# Patient Record
Sex: FEMALE | Race: WHITE | NOT HISPANIC OR LATINO | URBAN - METROPOLITAN AREA
[De-identification: names, ages, dates, MRNs, and addresses within clinical notes are randomized per-mention and may not be internally consistent; named-entity substitution may affect disease eponyms.]

---

## 2018-05-13 ENCOUNTER — EMERGENCY (EMERGENCY)
Facility: HOSPITAL | Age: 63
LOS: 1 days | Discharge: HOME | End: 2018-05-13
Attending: EMERGENCY MEDICINE | Admitting: EMERGENCY MEDICINE
Payer: COMMERCIAL

## 2018-05-13 VITALS
SYSTOLIC BLOOD PRESSURE: 136 MMHG | OXYGEN SATURATION: 99 % | DIASTOLIC BLOOD PRESSURE: 67 MMHG | HEART RATE: 71 BPM | RESPIRATION RATE: 16 BRPM | TEMPERATURE: 98 F

## 2018-05-13 VITALS
HEART RATE: 60 BPM | SYSTOLIC BLOOD PRESSURE: 129 MMHG | OXYGEN SATURATION: 100 % | RESPIRATION RATE: 18 BRPM | DIASTOLIC BLOOD PRESSURE: 58 MMHG

## 2018-05-13 LAB
ALBUMIN SERPL ELPH-MCNC: 4.4 G/DL — SIGNIFICANT CHANGE UP (ref 3.3–5)
ALP SERPL-CCNC: 83 U/L — SIGNIFICANT CHANGE UP (ref 40–120)
ALT FLD-CCNC: 9 U/L — SIGNIFICANT CHANGE UP (ref 4–33)
APPEARANCE UR: SIGNIFICANT CHANGE UP
APTT BLD: 31.1 SEC — SIGNIFICANT CHANGE UP (ref 27.5–37.4)
AST SERPL-CCNC: 13 U/L — SIGNIFICANT CHANGE UP (ref 4–32)
BACTERIA # UR AUTO: HIGH
BASE EXCESS BLDV CALC-SCNC: -0.6 MMOL/L — SIGNIFICANT CHANGE UP
BASOPHILS # BLD AUTO: 0.04 K/UL — SIGNIFICANT CHANGE UP (ref 0–0.2)
BASOPHILS NFR BLD AUTO: 0.5 % — SIGNIFICANT CHANGE UP (ref 0–2)
BILIRUB SERPL-MCNC: 0.8 MG/DL — SIGNIFICANT CHANGE UP (ref 0.2–1.2)
BILIRUB UR-MCNC: NEGATIVE — SIGNIFICANT CHANGE UP
BLOOD GAS VENOUS - CREATININE: 1.01 MG/DL — SIGNIFICANT CHANGE UP (ref 0.5–1.3)
BLOOD UR QL VISUAL: NEGATIVE — SIGNIFICANT CHANGE UP
BUN SERPL-MCNC: 12 MG/DL — SIGNIFICANT CHANGE UP (ref 7–23)
CALCIUM SERPL-MCNC: 9.1 MG/DL — SIGNIFICANT CHANGE UP (ref 8.4–10.5)
CHLORIDE BLDV-SCNC: 111 MMOL/L — HIGH (ref 96–108)
CHLORIDE SERPL-SCNC: 103 MMOL/L — SIGNIFICANT CHANGE UP (ref 98–107)
CO2 SERPL-SCNC: 23 MMOL/L — SIGNIFICANT CHANGE UP (ref 22–31)
COLOR SPEC: SIGNIFICANT CHANGE UP
CREAT SERPL-MCNC: 1.13 MG/DL — SIGNIFICANT CHANGE UP (ref 0.5–1.3)
EOSINOPHIL # BLD AUTO: 0.17 K/UL — SIGNIFICANT CHANGE UP (ref 0–0.5)
EOSINOPHIL NFR BLD AUTO: 2.3 % — SIGNIFICANT CHANGE UP (ref 0–6)
GAS PNL BLDV: 138 MMOL/L — SIGNIFICANT CHANGE UP (ref 136–146)
GLUCOSE BLDV-MCNC: 93 — SIGNIFICANT CHANGE UP (ref 70–99)
GLUCOSE SERPL-MCNC: 94 MG/DL — SIGNIFICANT CHANGE UP (ref 70–99)
GLUCOSE UR-MCNC: NEGATIVE — SIGNIFICANT CHANGE UP
HCO3 BLDV-SCNC: 23 MMOL/L — SIGNIFICANT CHANGE UP (ref 20–27)
HCT VFR BLD CALC: 38.3 % — SIGNIFICANT CHANGE UP (ref 34.5–45)
HCT VFR BLDV CALC: 43.2 % — SIGNIFICANT CHANGE UP (ref 34.5–45)
HGB BLD-MCNC: 13.4 G/DL — SIGNIFICANT CHANGE UP (ref 11.5–15.5)
HGB BLDV-MCNC: 14.1 G/DL — SIGNIFICANT CHANGE UP (ref 11.5–15.5)
IMM GRANULOCYTES # BLD AUTO: 0.01 # — SIGNIFICANT CHANGE UP
IMM GRANULOCYTES NFR BLD AUTO: 0.1 % — SIGNIFICANT CHANGE UP (ref 0–1.5)
INR BLD: 0.99 — SIGNIFICANT CHANGE UP (ref 0.88–1.17)
KETONES UR-MCNC: NEGATIVE — SIGNIFICANT CHANGE UP
LACTATE BLDV-MCNC: 0.9 MMOL/L — SIGNIFICANT CHANGE UP (ref 0.5–2)
LEUKOCYTE ESTERASE UR-ACNC: HIGH
LIDOCAIN IGE QN: 35.4 U/L — SIGNIFICANT CHANGE UP (ref 7–60)
LYMPHOCYTES # BLD AUTO: 2.66 K/UL — SIGNIFICANT CHANGE UP (ref 1–3.3)
LYMPHOCYTES # BLD AUTO: 35.7 % — SIGNIFICANT CHANGE UP (ref 13–44)
MCHC RBC-ENTMCNC: 32 PG — SIGNIFICANT CHANGE UP (ref 27–34)
MCHC RBC-ENTMCNC: 35 % — SIGNIFICANT CHANGE UP (ref 32–36)
MCV RBC AUTO: 91.4 FL — SIGNIFICANT CHANGE UP (ref 80–100)
MONOCYTES # BLD AUTO: 0.51 K/UL — SIGNIFICANT CHANGE UP (ref 0–0.9)
MONOCYTES NFR BLD AUTO: 6.8 % — SIGNIFICANT CHANGE UP (ref 2–14)
MUCOUS THREADS # UR AUTO: SIGNIFICANT CHANGE UP
NEUTROPHILS # BLD AUTO: 4.06 K/UL — SIGNIFICANT CHANGE UP (ref 1.8–7.4)
NEUTROPHILS NFR BLD AUTO: 54.6 % — SIGNIFICANT CHANGE UP (ref 43–77)
NITRITE UR-MCNC: NEGATIVE — SIGNIFICANT CHANGE UP
NON-SQ EPI CELLS # UR AUTO: <1 — SIGNIFICANT CHANGE UP
NRBC # FLD: 0 — SIGNIFICANT CHANGE UP
PCO2 BLDV: 42 MMHG — SIGNIFICANT CHANGE UP (ref 41–51)
PH BLDV: 7.37 PH — SIGNIFICANT CHANGE UP (ref 7.32–7.43)
PH UR: 6 — SIGNIFICANT CHANGE UP (ref 4.6–8)
PLATELET # BLD AUTO: 295 K/UL — SIGNIFICANT CHANGE UP (ref 150–400)
PMV BLD: 9.7 FL — SIGNIFICANT CHANGE UP (ref 7–13)
PO2 BLDV: 42 MMHG — HIGH (ref 35–40)
POTASSIUM BLDV-SCNC: 3.4 MMOL/L — SIGNIFICANT CHANGE UP (ref 3.4–4.5)
POTASSIUM SERPL-MCNC: 3.5 MMOL/L — SIGNIFICANT CHANGE UP (ref 3.5–5.3)
POTASSIUM SERPL-SCNC: 3.5 MMOL/L — SIGNIFICANT CHANGE UP (ref 3.5–5.3)
PROT SERPL-MCNC: 7.5 G/DL — SIGNIFICANT CHANGE UP (ref 6–8.3)
PROT UR-MCNC: NEGATIVE MG/DL — SIGNIFICANT CHANGE UP
PROTHROM AB SERPL-ACNC: 11 SEC — SIGNIFICANT CHANGE UP (ref 9.8–13.1)
RBC # BLD: 4.19 M/UL — SIGNIFICANT CHANGE UP (ref 3.8–5.2)
RBC # FLD: 13.7 % — SIGNIFICANT CHANGE UP (ref 10.3–14.5)
RBC CASTS # UR COMP ASSIST: SIGNIFICANT CHANGE UP (ref 0–?)
SAO2 % BLDV: 76.5 % — SIGNIFICANT CHANGE UP (ref 60–85)
SODIUM SERPL-SCNC: 139 MMOL/L — SIGNIFICANT CHANGE UP (ref 135–145)
SP GR SPEC: 1.01 — SIGNIFICANT CHANGE UP (ref 1–1.04)
SQUAMOUS # UR AUTO: SIGNIFICANT CHANGE UP
UROBILINOGEN FLD QL: NORMAL MG/DL — SIGNIFICANT CHANGE UP
WBC # BLD: 7.45 K/UL — SIGNIFICANT CHANGE UP (ref 3.8–10.5)
WBC # FLD AUTO: 7.45 K/UL — SIGNIFICANT CHANGE UP (ref 3.8–10.5)
WBC UR QL: SIGNIFICANT CHANGE UP (ref 0–?)

## 2018-05-13 PROCEDURE — 76830 TRANSVAGINAL US NON-OB: CPT | Mod: 26

## 2018-05-13 PROCEDURE — 99285 EMERGENCY DEPT VISIT HI MDM: CPT

## 2018-05-13 PROCEDURE — 74177 CT ABD & PELVIS W/CONTRAST: CPT | Mod: 26

## 2018-05-13 RX ORDER — MORPHINE SULFATE 50 MG/1
2 CAPSULE, EXTENDED RELEASE ORAL ONCE
Qty: 0 | Refills: 0 | Status: DISCONTINUED | OUTPATIENT
Start: 2018-05-13 | End: 2018-05-13

## 2018-05-13 RX ORDER — SODIUM CHLORIDE 9 MG/ML
1000 INJECTION INTRAMUSCULAR; INTRAVENOUS; SUBCUTANEOUS ONCE
Qty: 0 | Refills: 0 | Status: COMPLETED | OUTPATIENT
Start: 2018-05-13 | End: 2018-05-13

## 2018-05-13 RX ADMIN — MORPHINE SULFATE 2 MILLIGRAM(S): 50 CAPSULE, EXTENDED RELEASE ORAL at 21:06

## 2018-05-13 RX ADMIN — MORPHINE SULFATE 2 MILLIGRAM(S): 50 CAPSULE, EXTENDED RELEASE ORAL at 22:00

## 2018-05-13 RX ADMIN — SODIUM CHLORIDE 500 MILLILITER(S): 9 INJECTION INTRAMUSCULAR; INTRAVENOUS; SUBCUTANEOUS at 20:27

## 2018-05-13 NOTE — ED PROVIDER NOTE - PHYSICAL EXAMINATION
*GEN:   in no acute distress, AOx3    ///    *EYES:   pupils equally round and reactive to light, extra-occular movements intact    ///    *HEENT:   airway patent, moist mucosal membranes    ///    *CV:   regular rate and rhythm    ///    *RESP:   clear to auscultation bilaterally, non-labored    ///    *ABD:   soft, mild tenderness to palpation RLQ w/out rebound    ///    *:   no cva/flank tenderness    ///    *MSK:   no MSK tenderness or limited ROM    ///    *SKIN:   dry, intact    ///    *NEURO:   AOx3, no focal weakness or loss of sensation, gait normal

## 2018-05-13 NOTE — ED PROVIDER NOTE - MEDICAL DECISION MAKING DETAILS
63 yo F w/ RLQ pain x 10 days, h/o cholecystectomy and partial oophrectomy, r/o appendicitis vs ovarian pathology, other intra-abd pathology; CT ab/p, labs, pain relief, reassess

## 2018-05-13 NOTE — ED ADULT NURSE NOTE - CHIEF COMPLAINT QUOTE
Pt. c/o intermittent RLQ pain x 2 weeks. Now more constant, radiating to right flank and having diarrhea right after eating a meal. Denies n/v or fevers. Pmhx glaucoma, gastritis, Mitral valve prolapse

## 2018-05-13 NOTE — ED PROVIDER NOTE - ATTENDING CONTRIBUTION TO CARE
kellie: approx 2-3 week hx of intermittent pain rt low low abdomen. increased past 2-3 days, radiating to the low back. No other new sx.   exam: mild tenderness rlq. no guarding. no inguinal tenderness. no CVAT. exam otherwise unremarkable.  recc: ultrasound pelvic to evaluate ovary. CT to evaluate for appendicitis/other abd pathology

## 2018-05-13 NOTE — ED ADULT TRIAGE NOTE - CHIEF COMPLAINT QUOTE
Pt. c/o intermittent RLQ pain x 2 weeks. Now more constant, radiating to right flank and having diarrhea right after eating a meal. Denies n/v or fevers. Pmhx glaucoma, gastritis, Mitral valve prolapse RHINORRHEA

## 2018-05-13 NOTE — ED ADULT NURSE REASSESSMENT NOTE - NS ED NURSE REASSESS COMMENT FT1
pt aox4; oral contrast completed at this time; pt reports pain in abdomen radiating to right side.  Pending ct of ct of abdomen. Will continue to monitor/assess

## 2018-05-13 NOTE — ED ADULT NURSE NOTE - OBJECTIVE STATEMENT
c/o RLQ pain for 2 weeks. radiating to groin/hip and R lower back. was intermittent, now constant. also having diarrhea. no nausea, vomiting, urinary symptoms. labs sent. 20 g iv placed in left ac. awaits md ratliff in no acute distress. rpt given to primary RN in area. c/o RLQ pain for 2 weeks. radiating to groin/hip and R lower back. was intermittent, now constant. also having diarrhea, non bloody. no nausea, vomiting, urinary symptoms. labs sent. 20 g iv placed in right ac. awaits md ratliff in no acute distress. rpt given to primary RN in area.

## 2018-05-13 NOTE — ED PROVIDER NOTE - PMH
Gastritis    Heart Murmur  Denies syncope, denies dizziness, denies palpitations.  last echo done n2008  Migraine Headache

## 2018-05-14 RX ORDER — DIPHENHYDRAMINE HCL 50 MG
50 CAPSULE ORAL ONCE
Qty: 0 | Refills: 0 | Status: COMPLETED | OUTPATIENT
Start: 2018-05-14 | End: 2018-05-14

## 2018-05-14 RX ORDER — OXYCODONE AND ACETAMINOPHEN 5; 325 MG/1; MG/1
1 TABLET ORAL ONCE
Qty: 0 | Refills: 0 | Status: DISCONTINUED | OUTPATIENT
Start: 2018-05-14 | End: 2018-05-14

## 2018-05-14 RX ORDER — CEPHALEXIN 500 MG
1 CAPSULE ORAL
Qty: 10 | Refills: 0 | OUTPATIENT
Start: 2018-05-14 | End: 2018-05-18

## 2018-05-14 RX ADMIN — OXYCODONE AND ACETAMINOPHEN 1 TABLET(S): 5; 325 TABLET ORAL at 02:05

## 2018-05-14 NOTE — ED ADULT NURSE REASSESSMENT NOTE - NS ED NURSE REASSESS COMMENT FT1
pt aox4; given pain medication for pain 5/10 prior to d/c as per MD Narayanan.  JAVIER instructions given IV access removed. Pt verbalized understanding

## 2018-06-04 ENCOUNTER — APPOINTMENT (OUTPATIENT)
Dept: PAIN MANAGEMENT | Facility: CLINIC | Age: 63
End: 2018-06-04
Payer: COMMERCIAL

## 2018-06-04 VITALS
WEIGHT: 159 LBS | HEIGHT: 69 IN | HEART RATE: 64 BPM | SYSTOLIC BLOOD PRESSURE: 133 MMHG | BODY MASS INDEX: 23.55 KG/M2 | DIASTOLIC BLOOD PRESSURE: 79 MMHG

## 2018-06-04 PROCEDURE — 99214 OFFICE O/P EST MOD 30 MIN: CPT

## 2018-06-04 RX ORDER — AMOXICILLIN 500 MG/1
500 CAPSULE ORAL
Qty: 24 | Refills: 0 | Status: ACTIVE | COMMUNITY
Start: 2017-12-15

## 2018-06-11 ENCOUNTER — MEDICATION RENEWAL (OUTPATIENT)
Age: 63
End: 2018-06-11

## 2018-10-25 ENCOUNTER — INPATIENT (INPATIENT)
Facility: HOSPITAL | Age: 63
LOS: 0 days | Discharge: HOME | DRG: 310 | End: 2018-10-26
Attending: HOSPITALIST | Admitting: HOSPITALIST
Payer: COMMERCIAL

## 2018-10-25 VITALS
SYSTOLIC BLOOD PRESSURE: 121 MMHG | WEIGHT: 156.09 LBS | RESPIRATION RATE: 18 BRPM | TEMPERATURE: 98 F | DIASTOLIC BLOOD PRESSURE: 56 MMHG | HEIGHT: 69 IN | HEART RATE: 90 BPM | OXYGEN SATURATION: 97 %

## 2018-10-25 DIAGNOSIS — F32.9 MAJOR DEPRESSIVE DISORDER, SINGLE EPISODE, UNSPECIFIED: ICD-10-CM

## 2018-10-25 DIAGNOSIS — H40.9 UNSPECIFIED GLAUCOMA: ICD-10-CM

## 2018-10-25 DIAGNOSIS — K29.70 GASTRITIS, UNSPECIFIED, WITHOUT BLEEDING: ICD-10-CM

## 2018-10-25 DIAGNOSIS — R01.1 CARDIAC MURMUR, UNSPECIFIED: ICD-10-CM

## 2018-10-25 DIAGNOSIS — Z29.9 ENCOUNTER FOR PROPHYLACTIC MEASURES, UNSPECIFIED: ICD-10-CM

## 2018-10-25 DIAGNOSIS — Z00.00 ENCOUNTER FOR GENERAL ADULT MEDICAL EXAMINATION WITHOUT ABNORMAL FINDINGS: ICD-10-CM

## 2018-10-25 DIAGNOSIS — I48.91 UNSPECIFIED ATRIAL FIBRILLATION: ICD-10-CM

## 2018-10-25 LAB
ALBUMIN SERPL ELPH-MCNC: 4.3 G/DL — SIGNIFICANT CHANGE UP (ref 3.3–5)
ALP SERPL-CCNC: 84 U/L — SIGNIFICANT CHANGE UP (ref 40–120)
ALT FLD-CCNC: 7 U/L — LOW (ref 10–45)
ANION GAP SERPL CALC-SCNC: 13 MMOL/L — SIGNIFICANT CHANGE UP (ref 5–17)
AST SERPL-CCNC: 12 U/L — SIGNIFICANT CHANGE UP (ref 10–40)
BASOPHILS # BLD AUTO: 0 K/UL — SIGNIFICANT CHANGE UP (ref 0–0.2)
BASOPHILS NFR BLD AUTO: 0.5 % — SIGNIFICANT CHANGE UP (ref 0–2)
BILIRUB SERPL-MCNC: 0.9 MG/DL — SIGNIFICANT CHANGE UP (ref 0.2–1.2)
BUN SERPL-MCNC: 11 MG/DL — SIGNIFICANT CHANGE UP (ref 7–23)
CALCIUM SERPL-MCNC: 10.1 MG/DL — SIGNIFICANT CHANGE UP (ref 8.4–10.5)
CHLORIDE SERPL-SCNC: 106 MMOL/L — SIGNIFICANT CHANGE UP (ref 96–108)
CO2 SERPL-SCNC: 22 MMOL/L — SIGNIFICANT CHANGE UP (ref 22–31)
CREAT SERPL-MCNC: 0.86 MG/DL — SIGNIFICANT CHANGE UP (ref 0.5–1.3)
EOSINOPHIL # BLD AUTO: 0.1 K/UL — SIGNIFICANT CHANGE UP (ref 0–0.5)
EOSINOPHIL NFR BLD AUTO: 1.2 % — SIGNIFICANT CHANGE UP (ref 0–6)
GLUCOSE SERPL-MCNC: 99 MG/DL — SIGNIFICANT CHANGE UP (ref 70–99)
HCT VFR BLD CALC: 43 % — SIGNIFICANT CHANGE UP (ref 34.5–45)
HGB BLD-MCNC: 14.6 G/DL — SIGNIFICANT CHANGE UP (ref 11.5–15.5)
LYMPHOCYTES # BLD AUTO: 1.3 K/UL — SIGNIFICANT CHANGE UP (ref 1–3.3)
LYMPHOCYTES # BLD AUTO: 18.3 % — SIGNIFICANT CHANGE UP (ref 13–44)
MAGNESIUM SERPL-MCNC: 2.4 MG/DL — SIGNIFICANT CHANGE UP (ref 1.6–2.6)
MCHC RBC-ENTMCNC: 31.3 PG — SIGNIFICANT CHANGE UP (ref 27–34)
MCHC RBC-ENTMCNC: 33.8 GM/DL — SIGNIFICANT CHANGE UP (ref 32–36)
MCV RBC AUTO: 92.6 FL — SIGNIFICANT CHANGE UP (ref 80–100)
MONOCYTES # BLD AUTO: 0.5 K/UL — SIGNIFICANT CHANGE UP (ref 0–0.9)
MONOCYTES NFR BLD AUTO: 6.8 % — SIGNIFICANT CHANGE UP (ref 2–14)
NEUTROPHILS # BLD AUTO: 5.3 K/UL — SIGNIFICANT CHANGE UP (ref 1.8–7.4)
NEUTROPHILS NFR BLD AUTO: 73.1 % — SIGNIFICANT CHANGE UP (ref 43–77)
PHOSPHATE SERPL-MCNC: 2.7 MG/DL — SIGNIFICANT CHANGE UP (ref 2.5–4.5)
PLATELET # BLD AUTO: 356 K/UL — SIGNIFICANT CHANGE UP (ref 150–400)
POTASSIUM SERPL-MCNC: 4.6 MMOL/L — SIGNIFICANT CHANGE UP (ref 3.5–5.3)
POTASSIUM SERPL-SCNC: 4.6 MMOL/L — SIGNIFICANT CHANGE UP (ref 3.5–5.3)
PROT SERPL-MCNC: 7.2 G/DL — SIGNIFICANT CHANGE UP (ref 6–8.3)
RBC # BLD: 4.65 M/UL — SIGNIFICANT CHANGE UP (ref 3.8–5.2)
RBC # FLD: 12.6 % — SIGNIFICANT CHANGE UP (ref 10.3–14.5)
SODIUM SERPL-SCNC: 141 MMOL/L — SIGNIFICANT CHANGE UP (ref 135–145)
TROPONIN T, HIGH SENSITIVITY RESULT: <6 NG/L — SIGNIFICANT CHANGE UP (ref 0–51)
TSH SERPL-MCNC: 1.34 UIU/ML — SIGNIFICANT CHANGE UP (ref 0.27–4.2)
WBC # BLD: 7.2 K/UL — SIGNIFICANT CHANGE UP (ref 3.8–10.5)
WBC # FLD AUTO: 7.2 K/UL — SIGNIFICANT CHANGE UP (ref 3.8–10.5)

## 2018-10-25 PROCEDURE — 93306 TTE W/DOPPLER COMPLETE: CPT | Mod: 26

## 2018-10-25 PROCEDURE — 99223 1ST HOSP IP/OBS HIGH 75: CPT | Mod: AI

## 2018-10-25 PROCEDURE — 99285 EMERGENCY DEPT VISIT HI MDM: CPT

## 2018-10-25 PROCEDURE — 99255 IP/OBS CONSLTJ NEW/EST HI 80: CPT | Mod: GC

## 2018-10-25 RX ORDER — HEPARIN SODIUM 5000 [USP'U]/ML
5000 INJECTION INTRAVENOUS; SUBCUTANEOUS EVERY 8 HOURS
Qty: 0 | Refills: 0 | Status: DISCONTINUED | OUTPATIENT
Start: 2018-10-25 | End: 2018-10-26

## 2018-10-25 RX ORDER — METOPROLOL TARTRATE 50 MG
12.5 TABLET ORAL DAILY
Qty: 0 | Refills: 0 | Status: DISCONTINUED | OUTPATIENT
Start: 2018-10-25 | End: 2018-10-26

## 2018-10-25 RX ORDER — FAMOTIDINE 10 MG/ML
20 INJECTION INTRAVENOUS DAILY
Qty: 0 | Refills: 0 | Status: DISCONTINUED | OUTPATIENT
Start: 2018-10-25 | End: 2018-10-26

## 2018-10-25 RX ORDER — DORZOLAMIDE HYDROCHLORIDE 20 MG/ML
1 SOLUTION/ DROPS OPHTHALMIC THREE TIMES A DAY
Qty: 0 | Refills: 0 | Status: DISCONTINUED | OUTPATIENT
Start: 2018-10-25 | End: 2018-10-26

## 2018-10-25 RX ORDER — LATANOPROST 0.05 MG/ML
1 SOLUTION/ DROPS OPHTHALMIC; TOPICAL AT BEDTIME
Qty: 0 | Refills: 0 | Status: DISCONTINUED | OUTPATIENT
Start: 2018-10-25 | End: 2018-10-26

## 2018-10-25 RX ORDER — SODIUM CHLORIDE 9 MG/ML
1000 INJECTION INTRAMUSCULAR; INTRAVENOUS; SUBCUTANEOUS ONCE
Qty: 0 | Refills: 0 | Status: COMPLETED | OUTPATIENT
Start: 2018-10-25 | End: 2018-10-25

## 2018-10-25 RX ORDER — ASPIRIN/CALCIUM CARB/MAGNESIUM 324 MG
81 TABLET ORAL DAILY
Qty: 0 | Refills: 0 | Status: DISCONTINUED | OUTPATIENT
Start: 2018-10-25 | End: 2018-10-26

## 2018-10-25 RX ORDER — SODIUM CHLORIDE 9 MG/ML
1000 INJECTION INTRAMUSCULAR; INTRAVENOUS; SUBCUTANEOUS
Qty: 0 | Refills: 0 | Status: DISCONTINUED | OUTPATIENT
Start: 2018-10-25 | End: 2018-10-26

## 2018-10-25 RX ORDER — ASPIRIN/CALCIUM CARB/MAGNESIUM 324 MG
324 TABLET ORAL ONCE
Qty: 0 | Refills: 0 | Status: COMPLETED | OUTPATIENT
Start: 2018-10-25 | End: 2018-10-25

## 2018-10-25 RX ADMIN — Medication 324 MILLIGRAM(S): at 12:11

## 2018-10-25 RX ADMIN — SODIUM CHLORIDE 1000 MILLILITER(S): 9 INJECTION INTRAMUSCULAR; INTRAVENOUS; SUBCUTANEOUS at 10:39

## 2018-10-25 RX ADMIN — SODIUM CHLORIDE 75 MILLILITER(S): 9 INJECTION INTRAMUSCULAR; INTRAVENOUS; SUBCUTANEOUS at 15:02

## 2018-10-25 RX ADMIN — Medication 12.5 MILLIGRAM(S): at 17:27

## 2018-10-25 NOTE — ED ADULT NURSE NOTE - NSIMPLEMENTINTERV_GEN_ALL_ED
Implemented All Universal Safety Interventions:  [place] to call system. Call bell, personal items and telephone within reach. Instruct patient to call for assistance. Room bathroom lighting operational. Non-slip footwear when patient is off stretcher. Physically safe environment: no spills, clutter or unnecessary equipment. Stretcher in lowest position, wheels locked, appropriate side rails in place.

## 2018-10-25 NOTE — ED PROVIDER NOTE - MEDICAL DECISION MAKING DETAILS
61yo F pmhx MVP, glaucoma, depression p/w CC palpitations. Will send for EKG, labs mg phos trops tsh, give fluids and reassess. Sita: 61yo F pmhx MVP, glaucoma, depression p/w CC palpitations. Will send for EKG, labs mg phos trops tsh, give fluids and reassess.

## 2018-10-25 NOTE — H&P ADULT - NSHPLABSRESULTS_GEN_ALL_CORE
LABS personally reviewed by me and significant for:    WBC = 7.2  Hb = 14.6  Plt = 356    Na = 141  K = 4.6  Cl = 106  CO2 = 22    BUN = 11  Cr = 0.86    Glucose = 99        EKG: personally reviewed by me = A fib @ 80 bpm, incomplete RBBB

## 2018-10-25 NOTE — H&P ADULT - PROBLEM SELECTOR PLAN 5
pt had started prozac recently; do not suspect prozac is directly related to a fib, but will hold today's dose, until further eval form psych, who pt plans to see tomorrow upon discharge, or the next day; pt would not like to see inpt psych

## 2018-10-25 NOTE — H&P ADULT - PMH
Gastritis    Glaucoma    Heart Murmur  Denies syncope, denies dizziness, denies palpitations.  last echo done n2008  Migraine Headache

## 2018-10-25 NOTE — CONSULT NOTE ADULT - SUBJECTIVE AND OBJECTIVE BOX
Patient seen and evaluated at bedside    Chief Complaint:  palpitations    HPI:  62F with a history of mitral valve prolapse, depression, glaucoma, GERD who presents with palpitations for the past couple days. Patient reports that she has been under a lot of stress and has been feeling overall weak and has been feeling palpitations intermittently. No CP or SOB. Has never been told she has afib, never had stress test. Reports her prozac was increased recently from 10 to 20mg.   Was given 1L IVF in ED. Given loading dose of ASA.    PMH:   Heart Murmur  Gastritis  Migraine Headache      PSH:   cholecystectomy  Status Post partial  Left Oophorectomy      Medications:   dorzolamide 2% Ophthalmic Solution 1 Drop(s) Both EYES three times a day  famotidine    Tablet 20 milliGRAM(s) Oral daily  heparin  Injectable 5000 Unit(s) SubCutaneous every 8 hours  latanoprost 0.005% Ophthalmic Solution 1 Drop(s) Both EYES at bedtime  sodium chloride 0.9%. 1000 milliLiter(s) IV Continuous <Continuous>      Allergies:  Ultram (Vomiting)  Yeast, foods containing tannin, citrus, cholocate, aged cheese, tyramine all cause migraine headaches (Headache)      FAMILY HISTORY:      Social History:  Smoking History:  Alcohol Use:  Drug Use:    Review of Systems:  REVIEW OF SYSTEMS:  CONSTITUTIONAL: No weakness, fevers or chills  EYES/ENT: No visual changes;  No dysphagia  NECK: No pain or stiffness  RESPIRATORY: No cough, wheezing, hemoptysis; No shortness of breath  CARDIOVASCULAR: No chest pain or palpitations; No lower extremity edema  GASTROINTESTINAL: No abdominal or epigastric pain. No nausea, vomiting, or hematemesis; No diarrhea or constipation. No melena or hematochezia.  BACK: No back pain  GENITOURINARY: No dysuria, frequency or hematuria  NEUROLOGICAL: No numbness or weakness  SKIN: No itching, burning, rashes, or lesions   All other review of systems is negative unless indicated above.    Physical Exam:  T(F): 97.8 (10-25), Max: 97.8 (10-25)  HR: 91 (10-25) (90 - 91)  BP: 140/92 (10-25) (121/56 - 140/92)  RR: 16 (10-25)  SpO2: 99% (10-25)  GENERAL: No acute distress, well-developed  HEAD:  Atraumatic, Normocephalic  ENT: EOMI, PERRLA, conjunctiva and sclera clear, Neck supple, No JVD, moist mucosa  CHEST/LUNG: Clear to auscultation bilaterally; No wheeze, equal breath sounds bilaterally   BACK: No spinal tenderness  HEART: Regular rate and rhythm; No murmurs, rubs, or gallops  ABDOMEN: Soft, Nontender, Nondistended; Bowel sounds present  EXTREMITIES:  No clubbing, cyanosis, or edema  PSYCH: Nl behavior, nl affect  NEUROLOGY: AAOx3, non-focal, cranial nerves intact  SKIN: Normal color, No rashes or lesions  LINES:      Labs: Personally reviewed                        14.6   7.2   )-----------( 356      ( 25 Oct 2018 10:46 )             43.0     10-25    141  |  106  |  11  ----------------------------<  99  4.6   |  22  |  0.86    Ca    10.1      25 Oct 2018 10:45  Phos  2.7     10-25  Mg     2.4     10-25    TPro  7.2  /  Alb  4.3  /  TBili  0.9  /  DBili  x   /  AST  12  /  ALT  7<L>  /  AlkPhos  84  10-25 Chief Complaint:  palpitations    HPI:  62F with a history of mitral valve prolapse, depression, glaucoma, GERD who presents with palpitations since 6am this morning, woke her from sleep. She felt weak all day. Patient reports that she has been under a lot of stress and has been feeling overall weak and has been feeling palpitations intermittently for the past couple months however was worse today. No CP or SOB. Has never been told she has afib, never had stress test. Reports her prozac was increased recently from 10 to 20mg.   Was given 1L IVF in ED. Given loading dose of ASA.    PMH:   Heart Murmur  Gastritis  Migraine Headache      PSH:   cholecystectomy  Status Post partial  Left Oophorectomy      Medications:   dorzolamide 2% Ophthalmic Solution 1 Drop(s) Both EYES three times a day  famotidine    Tablet 20 milliGRAM(s) Oral daily  heparin  Injectable 5000 Unit(s) SubCutaneous every 8 hours  latanoprost 0.005% Ophthalmic Solution 1 Drop(s) Both EYES at bedtime  sodium chloride 0.9%. 1000 milliLiter(s) IV Continuous <Continuous>      Allergies:  Ultram (Vomiting)  Yeast, foods containing tannin, citrus, cholocate, aged cheese, tyramine all cause migraine headaches (Headache)      FAMILY HISTORY:      Social History:  Smoking History:  Alcohol Use:  Drug Use:    Review of Systems:  REVIEW OF SYSTEMS:  CONSTITUTIONAL: No weakness, fevers or chills  EYES/ENT: No visual changes;  No dysphagia  NECK: No pain or stiffness  RESPIRATORY: No cough, wheezing, hemoptysis; No shortness of breath  CARDIOVASCULAR: No chest pain or palpitations; No lower extremity edema  GASTROINTESTINAL: No abdominal or epigastric pain. No nausea, vomiting, or hematemesis; No diarrhea or constipation. No melena or hematochezia.  BACK: No back pain  GENITOURINARY: No dysuria, frequency or hematuria  NEUROLOGICAL: No numbness or weakness  SKIN: No itching, burning, rashes, or lesions   All other review of systems is negative unless indicated above.    Physical Exam:  T(F): 97.8 (10-25), Max: 97.8 (10-25)  HR: 91 (10-25) (90 - 91)  BP: 140/92 (10-25) (121/56 - 140/92)  RR: 16 (10-25)  SpO2: 99% (10-25)  GENERAL: No acute distress, well-developed  HEAD:  Atraumatic, Normocephalic  ENT: EOMI, PERRLA, conjunctiva and sclera clear, Neck supple, No JVD, moist mucosa  CHEST/LUNG: Clear to auscultation bilaterally; No wheeze, equal breath sounds bilaterally   BACK: No spinal tenderness  HEART: Regular rate and rhythm; No murmurs, rubs, or gallops  ABDOMEN: Soft, Nontender, Nondistended; Bowel sounds present  EXTREMITIES:  No clubbing, cyanosis, or edema  PSYCH: Nl behavior, nl affect  NEUROLOGY: AAOx3, non-focal, cranial nerves intact  SKIN: Normal color, No rashes or lesions  LINES:      Labs: Personally reviewed                        14.6   7.2   )-----------( 356      ( 25 Oct 2018 10:46 )             43.0     10-25    141  |  106  |  11  ----------------------------<  99  4.6   |  22  |  0.86    Ca    10.1      25 Oct 2018 10:45  Phos  2.7     10-25  Mg     2.4     10-25    TPro  7.2  /  Alb  4.3  /  TBili  0.9  /  DBili  x   /  AST  12  /  ALT  7<L>  /  AlkPhos  84  10-25

## 2018-10-25 NOTE — ED ADULT NURSE NOTE - OBJECTIVE STATEMENT
62  yrs old female present to the ER for palpitations. As per pt she started to experience  palpitations this morning which has being ongoing . Pt reported that she had similar episodes of palpitations 3 weeks ago when she drank coffee from [employer] ([employer]). pt however stated that it did not last as long as today. Pt also denied having coffee beverage today. Denies chest pain, SOB

## 2018-10-25 NOTE — CONSULT NOTE ADULT - ASSESSMENT
62F with a history of mitral valve prolapse, depression, glaucoma, GERD who presents with palpitations for the past couple days. Found to be in afib/flutter in ED, rate of 80's.     #AFIB- new onset, CHADSVASC 1  -continue ASA  -check TTE  -would start low dose BB    Mickey Garibay MD  Cardiology Fellow PGY-5  [id number] 62F with a history of mitral valve prolapse, depression, glaucoma, GERD who presents with palpitations for the past couple days. Found to be in afib in ED, rate of 80's. Suspect that she has been in afib in the past few weeks.     #AFIB- new onset, CHADSVASC 1  -continue ASA for now, may transition to AC prior to discharge  -check TTE  -monitor on tele overnight, hold on BB for now  -check thyroid studies    Mickey Gairbay MD  Cardiology Fellow PGY-5  [id number]

## 2018-10-25 NOTE — ED PROVIDER NOTE - OBJECTIVE STATEMENT
61yo F pmhx mitral valve prolapse, depression, glaucoma p/w CC palpitations. Pt. explains she has been under a lot of stress recently as she feels her glaucoma is getting worse, pt. states that she recently increased her dose of prozac from 10 to 20mg. She states that for past couple of days she feels weak and not fully herself. This morning she describes waking up feeling like her heart was racing. Denies fever chills cp sob n/v/d.

## 2018-10-25 NOTE — H&P ADULT - HISTORY OF PRESENT ILLNESS
63 y/o f w/ PMH of mitral valve prolapse, glaucoma, depression p/w palpitations, starting this morning, when she felt her heart was racing, and she did not feel well.  She did not have any concomitant chest pressure, shortness of breath, cough, n/v, diaphoresis.  She also does not have any recent illnesses, fevers, chills.  She has never had symptoms like this before.  She has had worsening glaucoma symptoms over the last few weeks which has caused increased stress and depression.  She initiated a course of prozac over the last 10 days to treat her depression and will be going to her psychiatrist this week.  She is on a diet, with decreased po intake, and weight loss of five lbs in the last two weeks because of decreased eating and drinking, and has had some feeling of dehydration.  She did not have any recent alcohol intake.  She has no known thyroid disorder.    In the ED, she was found on EKG to have a fib @ 80 bpm, and was given normal saline.

## 2018-10-25 NOTE — H&P ADULT - NSHPREVIEWOFSYSTEMS_GEN_ALL_CORE
+ palpitations  + wt loss    - chest pain  - sob, cough  - abd pn, n/v  - fevers, chills  - bleeding, bruising  - skin changes, new rashes  - ha, vision changes  - extremity weakness, decreased sensation  - allergies, rhinorrhea  - temp intolerance

## 2018-10-25 NOTE — ED ADULT NURSE REASSESSMENT NOTE - NS ED NURSE REASSESS COMMENT FT1
Recevied report from previous shift RN. Patient resting comfortably in bed, reporting improvement in palpitations. Denies current HA, dizziness, CP, SOB. Patient well appearing with no acute distress noted. VSS, on CM showing A-fib. Patient speaking with MD aware of plan of care.

## 2018-10-25 NOTE — CONSULT NOTE ADULT - ATTENDING COMMENTS
62 year old woman having intermittent palpitations for last few weeks, but last night rapid racing of her heart coming out of her chest and came to ER. On exam rate controlled, irregular with systolic click at apex, otherwise unremarkable exam. EKG atrial fibrillation controlled rate, was not rapid on arrival though on no AV bekah blocking medications. Cardiac echo preliminarily observe mitral valve prolapse, but only mild MR, normal LV function.  Observe on telemetry. Suspect has had atrial fibrillation for few weeks. Indication of anticoagulation is borderline, but would favor starting anticoagulation with plan to cardiovert after 3 weeks.

## 2018-10-25 NOTE — ED ADULT NURSE NOTE - CHPI ED NUR SYMPTOMS NEG
no back pain/no nausea/no chills/no fever/no chest pain/no diaphoresis/no syncope/no shortness of breath

## 2018-10-25 NOTE — H&P ADULT - NSHPPHYSICALEXAM_GEN_ALL_CORE
Vital Signs Last 24 Hrs  T(C): 36.6 (25 Oct 2018 09:40), Max: 36.6 (25 Oct 2018 09:40)  T(F): 97.8 (25 Oct 2018 09:40), Max: 97.8 (25 Oct 2018 09:40)  HR: 91 (25 Oct 2018 11:24) (90 - 91)  BP: 140/92 (25 Oct 2018 11:24) (121/56 - 140/92)  BP(mean): --  RR: 16 (25 Oct 2018 11:24) (16 - 18)  SpO2: 99% (25 Oct 2018 11:24) (97% - 99%)    Gen - NAD  HEENT - perrla, eomi  CV - s1 and s2, irregular, + murmur  LUNGS - CTAB  Abd - soft, nd, nt, +bs  EXT - no c/c/e

## 2018-10-25 NOTE — H&P ADULT - PROBLEM SELECTOR PLAN 1
palpitations likely caused by a fib; etiology of a fib unclear  - infectious process seems unlikely given normal WBC, afebrile, and no symptoms  - no recent etoh intake  - pt has been dehyrdated, with wt loss and decreased po intake; pt given IVF NSin ED, will cont for next 12hrs  - no h/o thyroid disturbance - but will check TFTs  - r/o cardiac structural/valvular abnormality - 2d echo ordered  - cont to monitor on tele  - cards consult called - assess role of cardioversion, AC; pt kaur snot favor AC, and given low chads vasc risk score, and other possibilities resulting in a fib such as dehydration, high stress, appears reasonable option to hold off on AC, but will defer to cards and their discussions with the pt palpitations likely caused by a fib; etiology of a fib unclear  - infectious process seems unlikely given normal WBC, afebrile, and no symptoms  - no recent etoh intake  - pt has been dehyrdated, with wt loss and decreased po intake; pt given IVF NSin ED, will cont for next 12hrs  - no h/o thyroid disturbance - but will check TFTs  - r/o cardiac structural/valvular abnormality - 2d echo ordered  - cont to monitor on tele  - cards consult called - assess role of cardioversion, AC; pt kaur snot favor AC, and given low chads vasc risk score of 1, and other possibilities resulting in a fib such as dehydration, high stress, appears reasonable option to hold off on AC  - at this time cards recommending - asa, 2d echo, tele, and low dose metoprolol ; asa 81mg po qd and metoprolol 12.5mg po qd started

## 2018-10-25 NOTE — ED ADULT NURSE REASSESSMENT NOTE - NS ED NURSE REASSESS COMMENT FT1
Patient aware of plan of care for admission, Patient placed on portable CM. Admitting team at bedside speaking with patient about plan of care.

## 2018-10-25 NOTE — H&P ADULT - PROBLEM SELECTOR PLAN 2
f/u 2d echo to assess mitral valve prolapse and other valves, cardiac function in setting of new onset afib

## 2018-10-25 NOTE — ED ADULT TRIAGE NOTE - CHIEF COMPLAINT QUOTE
onset of palpitations upon waking up this AM around 6am. Patient recently started prozac 10 days ago, dose changed 4 days ago

## 2018-10-26 ENCOUNTER — TRANSCRIPTION ENCOUNTER (OUTPATIENT)
Age: 63
End: 2018-10-26

## 2018-10-26 ENCOUNTER — APPOINTMENT (OUTPATIENT)
Dept: PSYCHIATRY | Facility: CLINIC | Age: 63
End: 2018-10-26

## 2018-10-26 VITALS — HEART RATE: 62 BPM | DIASTOLIC BLOOD PRESSURE: 72 MMHG | SYSTOLIC BLOOD PRESSURE: 125 MMHG

## 2018-10-26 LAB
ANION GAP SERPL CALC-SCNC: 9 MMOL/L — SIGNIFICANT CHANGE UP (ref 5–17)
BASOPHILS # BLD AUTO: 0.1 K/UL — SIGNIFICANT CHANGE UP (ref 0–0.2)
BASOPHILS NFR BLD AUTO: 0.8 % — SIGNIFICANT CHANGE UP (ref 0–2)
BUN SERPL-MCNC: 16 MG/DL — SIGNIFICANT CHANGE UP (ref 7–23)
CALCIUM SERPL-MCNC: 9 MG/DL — SIGNIFICANT CHANGE UP (ref 8.4–10.5)
CHLORIDE SERPL-SCNC: 110 MMOL/L — HIGH (ref 96–108)
CO2 SERPL-SCNC: 20 MMOL/L — LOW (ref 22–31)
CREAT SERPL-MCNC: 0.93 MG/DL — SIGNIFICANT CHANGE UP (ref 0.5–1.3)
EOSINOPHIL # BLD AUTO: 0.2 K/UL — SIGNIFICANT CHANGE UP (ref 0–0.5)
EOSINOPHIL NFR BLD AUTO: 2.8 % — SIGNIFICANT CHANGE UP (ref 0–6)
GLUCOSE SERPL-MCNC: 85 MG/DL — SIGNIFICANT CHANGE UP (ref 70–99)
HCT VFR BLD CALC: 38.9 % — SIGNIFICANT CHANGE UP (ref 34.5–45)
HCV AB S/CO SERPL IA: 0.1 S/CO — SIGNIFICANT CHANGE UP
HCV AB SERPL-IMP: SIGNIFICANT CHANGE UP
HGB BLD-MCNC: 13.2 G/DL — SIGNIFICANT CHANGE UP (ref 11.5–15.5)
LYMPHOCYTES # BLD AUTO: 2.2 K/UL — SIGNIFICANT CHANGE UP (ref 1–3.3)
LYMPHOCYTES # BLD AUTO: 28.4 % — SIGNIFICANT CHANGE UP (ref 13–44)
MAGNESIUM SERPL-MCNC: 2.2 MG/DL — SIGNIFICANT CHANGE UP (ref 1.6–2.6)
MCHC RBC-ENTMCNC: 31.6 PG — SIGNIFICANT CHANGE UP (ref 27–34)
MCHC RBC-ENTMCNC: 33.9 GM/DL — SIGNIFICANT CHANGE UP (ref 32–36)
MCV RBC AUTO: 93 FL — SIGNIFICANT CHANGE UP (ref 80–100)
MONOCYTES # BLD AUTO: 0.4 K/UL — SIGNIFICANT CHANGE UP (ref 0–0.9)
MONOCYTES NFR BLD AUTO: 5.6 % — SIGNIFICANT CHANGE UP (ref 2–14)
NEUTROPHILS # BLD AUTO: 4.7 K/UL — SIGNIFICANT CHANGE UP (ref 1.8–7.4)
NEUTROPHILS NFR BLD AUTO: 62.4 % — SIGNIFICANT CHANGE UP (ref 43–77)
PHOSPHATE SERPL-MCNC: 3.5 MG/DL — SIGNIFICANT CHANGE UP (ref 2.5–4.5)
PLATELET # BLD AUTO: 324 K/UL — SIGNIFICANT CHANGE UP (ref 150–400)
POTASSIUM SERPL-MCNC: 4.2 MMOL/L — SIGNIFICANT CHANGE UP (ref 3.5–5.3)
POTASSIUM SERPL-SCNC: 4.2 MMOL/L — SIGNIFICANT CHANGE UP (ref 3.5–5.3)
RBC # BLD: 4.18 M/UL — SIGNIFICANT CHANGE UP (ref 3.8–5.2)
RBC # FLD: 12.2 % — SIGNIFICANT CHANGE UP (ref 10.3–14.5)
SODIUM SERPL-SCNC: 139 MMOL/L — SIGNIFICANT CHANGE UP (ref 135–145)
T3FREE SERPL-MCNC: 3.2 PG/ML — SIGNIFICANT CHANGE UP (ref 1.8–4.6)
T4 FREE SERPL-MCNC: 1.3 NG/DL — SIGNIFICANT CHANGE UP (ref 0.9–1.8)
WBC # BLD: 7.6 K/UL — SIGNIFICANT CHANGE UP (ref 3.8–10.5)
WBC # FLD AUTO: 7.6 K/UL — SIGNIFICANT CHANGE UP (ref 3.8–10.5)

## 2018-10-26 PROCEDURE — 84484 ASSAY OF TROPONIN QUANT: CPT

## 2018-10-26 PROCEDURE — 84439 ASSAY OF FREE THYROXINE: CPT

## 2018-10-26 PROCEDURE — 83735 ASSAY OF MAGNESIUM: CPT

## 2018-10-26 PROCEDURE — 80053 COMPREHEN METABOLIC PANEL: CPT

## 2018-10-26 PROCEDURE — 99285 EMERGENCY DEPT VISIT HI MDM: CPT | Mod: 25

## 2018-10-26 PROCEDURE — C8929: CPT

## 2018-10-26 PROCEDURE — 85027 COMPLETE CBC AUTOMATED: CPT

## 2018-10-26 PROCEDURE — 84443 ASSAY THYROID STIM HORMONE: CPT

## 2018-10-26 PROCEDURE — 84100 ASSAY OF PHOSPHORUS: CPT

## 2018-10-26 PROCEDURE — 86803 HEPATITIS C AB TEST: CPT

## 2018-10-26 PROCEDURE — 93005 ELECTROCARDIOGRAM TRACING: CPT

## 2018-10-26 PROCEDURE — G0378: CPT

## 2018-10-26 PROCEDURE — 99233 SBSQ HOSP IP/OBS HIGH 50: CPT | Mod: GC

## 2018-10-26 PROCEDURE — 84481 FREE ASSAY (FT-3): CPT

## 2018-10-26 PROCEDURE — 80048 BASIC METABOLIC PNL TOTAL CA: CPT

## 2018-10-26 RX ORDER — FLUOXETINE HCL 10 MG
1 CAPSULE ORAL
Qty: 7 | Refills: 0 | OUTPATIENT
Start: 2018-10-26 | End: 2018-11-01

## 2018-10-26 RX ORDER — METOPROLOL TARTRATE 50 MG
1 TABLET ORAL
Qty: 30 | Refills: 0 | OUTPATIENT
Start: 2018-10-26 | End: 2018-11-24

## 2018-10-26 RX ORDER — ASPIRIN/CALCIUM CARB/MAGNESIUM 324 MG
1 TABLET ORAL
Qty: 0 | Refills: 0 | DISCHARGE
Start: 2018-10-26

## 2018-10-26 RX ORDER — FLUOXETINE HCL 10 MG
1 CAPSULE ORAL
Qty: 0 | Refills: 0 | COMMUNITY

## 2018-10-26 RX ADMIN — Medication 81 MILLIGRAM(S): at 10:10

## 2018-10-26 RX ADMIN — FAMOTIDINE 20 MILLIGRAM(S): 10 INJECTION INTRAVENOUS at 10:10

## 2018-10-26 NOTE — PROGRESS NOTE ADULT - SUBJECTIVE AND OBJECTIVE BOX
Patient seen and examined at bedside.    Overnight Events:     Review of Systems:  REVIEW OF SYSTEMS:  CONSTITUTIONAL: No weakness, fevers or chills  EYES/ENT: No visual changes;  No dysphagia  NECK: No pain or stiffness  RESPIRATORY: No cough, wheezing, hemoptysis; No shortness of breath  CARDIOVASCULAR: No chest pain or palpitations; No lower extremity edema  GASTROINTESTINAL: No abdominal or epigastric pain. No nausea, vomiting, or hematemesis; No diarrhea or constipation. No melena or hematochezia.  BACK: No back pain  GENITOURINARY: No dysuria, frequency or hematuria  NEUROLOGICAL: No numbness or weakness  SKIN: No itching, burning, rashes, or lesions   All other review of systems is negative unless indicated above.            Medications:  aspirin  chewable 81 milliGRAM(s) Oral daily  dorzolamide 2% Ophthalmic Solution 1 Drop(s) Both EYES three times a day  famotidine    Tablet 20 milliGRAM(s) Oral daily  heparin  Injectable 5000 Unit(s) SubCutaneous every 8 hours  latanoprost 0.005% Ophthalmic Solution 1 Drop(s) Both EYES at bedtime  metoprolol succinate ER 12.5 milliGRAM(s) Oral daily  sodium chloride 0.9%. 1000 milliLiter(s) IV Continuous <Continuous>      PAST MEDICAL & SURGICAL HISTORY:  Glaucoma  Heart Murmur: Denies syncope, denies dizziness, denies palpitations.  last echo done n2008  Gastritis  Migraine Headache  cholecystectomy: 1989  Status Post partial  Left Oophorectomy: 1982      Vitals:  T(F): 98.6 (10-26), Max: 98.7 (10-25)  HR: 62 (10-26) (62 - 91)  BP: 125/72 (10-26) (93/58 - 140/92)  RR: 18 (10-26)  SpO2: 97% (10-26)  I&O's Summary    25 Oct 2018 07:01  -  26 Oct 2018 06:39  --------------------------------------------------------  IN: 120 mL / OUT: 0 mL / NET: 120 mL        Physical Exam:  Appearance: No acute distress; well appearing  Eyes: PERRL, EOMI, pink conjunctiva  HENT: Normal oral muscosa  Cardiovascular: RRR, S1, S2, no murmurs, rubs, or gallops; no edema; no JVD  Respiratory: Clear to auscultation bilaterally  Gastrointestinal: soft, non-tender, non-distended with normal bowel sounds  Musculoskeletal: No clubbing; no joint deformity   Neurologic: Non-focal  Lymphatic: No lymphadenopathy  Psychiatry: AAOx3, mood & affect appropriate  Skin: No rashes, ecchymoses, or cyanosis                          14.6   7.2   )-----------( 356      ( 25 Oct 2018 10:46 )             43.0     10-25    141  |  106  |  11  ----------------------------<  99  4.6   |  22  |  0.86    Ca    10.1      25 Oct 2018 10:45  Phos  2.7     10-25  Mg     2.4     10-25    TPro  7.2  /  Alb  4.3  /  TBili  0.9  /  DBili  x   /  AST  12  /  ALT  7<L>  /  AlkPhos  84  10-25      TTE:  Conclusions:  1. Normal left ventricular internal dimensions and wall  thicknesses.  2. Normal left ventricular systolic function. Endocardial  visualization enhanced with intravenous injection of  Ultrasonic Enhancing Agent (Definity).  3. The right ventricle is not well visualized; grossly  normal right ventricular systolic function. TVs' = 10  cm/sec.  4. Inadequate tricuspid regurgitation Doppler envelope  precludes estimation of RVSP.  *** No previous Echo exam. Patient seen and examined at bedside.    Overnight Events: no awareness of palpitation and unaware of when rhythm changed and reverted to normal.    Review of Systems:  REVIEW OF SYSTEMS:  CONSTITUTIONAL: No weakness, fevers or chills  EYES/ENT: No visual changes;  No dysphagia  NECK: No pain or stiffness  RESPIRATORY: No cough, wheezing, hemoptysis; No shortness of breath  CARDIOVASCULAR: No chest pain or palpitations; No lower extremity edema  GASTROINTESTINAL: No abdominal or epigastric pain. No nausea, vomiting, or hematemesis; No diarrhea or constipation. No melena or hematochezia.  BACK: No back pain  GENITOURINARY: No dysuria, frequency or hematuria  NEUROLOGICAL: No numbness or weakness  SKIN: No itching, burning, rashes, or lesions   All other review of systems is negative unless indicated above.            Medications:  aspirin  chewable 81 milliGRAM(s) Oral daily  dorzolamide 2% Ophthalmic Solution 1 Drop(s) Both EYES three times a day  famotidine    Tablet 20 milliGRAM(s) Oral daily  heparin  Injectable 5000 Unit(s) SubCutaneous every 8 hours  latanoprost 0.005% Ophthalmic Solution 1 Drop(s) Both EYES at bedtime  metoprolol succinate ER 12.5 milliGRAM(s) Oral daily  sodium chloride 0.9%. 1000 milliLiter(s) IV Continuous <Continuous>      PAST MEDICAL & SURGICAL HISTORY:  Glaucoma  Heart Murmur: Denies syncope, denies dizziness, denies palpitations.  last echo done n2008  Gastritis  Migraine Headache  cholecystectomy: 1989  Status Post partial  Left Oophorectomy: 1982      Vitals:  T(F): 98.6 (10-26), Max: 98.7 (10-25)  HR: 62 (10-26) (62 - 91)  BP: 125/72 (10-26) (93/58 - 140/92)  RR: 18 (10-26)  SpO2: 97% (10-26)  I&O's Summary    25 Oct 2018 07:01  -  26 Oct 2018 06:39  --------------------------------------------------------  IN: 120 mL / OUT: 0 mL / NET: 120 mL        Physical Exam:  Appearance: No acute distress; well appearing  Eyes: PERRL, EOMI, pink conjunctiva  HENT: Normal oral muscosa  Cardiovascular: RRR, S1, S2, no murmurs, rubs, or gallops; no edema; no JVD  Respiratory: Clear to auscultation bilaterally  Gastrointestinal: soft, non-tender, non-distended with normal bowel sounds  Musculoskeletal: No clubbing; no joint deformity   Neurologic: Non-focal  Lymphatic: No lymphadenopathy  Psychiatry: AAOx3, mood & affect appropriate  Skin: No rashes, ecchymoses, or cyanosis                          14.6   7.2   )-----------( 356      ( 25 Oct 2018 10:46 )             43.0     10-25    141  |  106  |  11  ----------------------------<  99  4.6   |  22  |  0.86    Ca    10.1      25 Oct 2018 10:45  Phos  2.7     10-25  Mg     2.4     10-25    TPro  7.2  /  Alb  4.3  /  TBili  0.9  /  DBili  x   /  AST  12  /  ALT  7<L>  /  AlkPhos  84  10-25      TTE:  Conclusions:  1. Normal left ventricular internal dimensions and wall  thicknesses.  2. Normal left ventricular systolic function. Endocardial  visualization enhanced with intravenous injection of  Ultrasonic Enhancing Agent (Definity).  3. The right ventricle is not well visualized; grossly  normal right ventricular systolic function. TVs' = 10  cm/sec.  4. Inadequate tricuspid regurgitation Doppler envelope  precludes estimation of RVSP.  *** No previous Echo exam.

## 2018-10-26 NOTE — PROGRESS NOTE ADULT - ATTENDING COMMENTS
62 year old woman having intermittent palpitations for last few weeks, but night prior to admission rapid racing of her heart coming out of her chest and came to ER. On exam today rhythm regular, rate normal, has systolic click at apex, otherwise unremarkable exam. Telemetry is sinus rhythm, have no available documentation of conversion to sinus. Was not rapid on arrival in ER though was on no AV bekah blocking medications. Cardiac echo preliminarily observe mitral valve prolapse, but only mild MR, normal LV function. Suspect has had atrial fibrillation at least intermittently for few weeks. Indication for anticoagulation is borderline and discussed with her and is unwilling to decide yet. Would favor low dose beta blocker (metoroprol succinate ER 25 mg daily), but she may refuse this as well. Thus should be discharged to home this morning and follow up as outpatient. 62 year old woman having intermittent palpitations for last few weeks, but night prior to admission rapid racing of her heart coming out of her chest and came to ER. On exam today rhythm regular, rate normal, has systolic click at apex, otherwise unremarkable exam. Telemetry is sinus rhythm, have no available documentation of conversion to sinus. Was not rapid on arrival in ER though was on no AV bekah blocking medications. Cardiac echo preliminarily observe mitral valve prolapse, but only mild MR, normal LV function. Suspect has had atrial fibrillation at least intermittently for few weeks. Indication for anticoagulation is borderline and discussed with her and is unwilling to decide yet. Would favor low dose beta blocker (metoroprol succinate ER 25 mg daily), but she may refuse this as well. Have discussed abstinence from alcohol and caffeine. Should be discharged to home this morning and follow up as outpatient.

## 2018-10-26 NOTE — DISCHARGE NOTE ADULT - HOSPITAL COURSE
61 y/o f w/ PMH of mitral valve prolapse, glaucoma, depression p/w palpitations, starting this morning, when she felt her heart was racing, and she did not feel well.  She did not have any concomitant chest pressure, shortness of breath, cough, n/v, diaphoresis.  She also does not have any recent illnesses, fevers, chills.  She has never had symptoms like this before.  She has had worsening glaucoma symptoms over the last few weeks which has caused increased stress and depression.  She initiated a course of prozac over the last 10 days to treat her depression and will be going to her psychiatrist this week.  She is on a diet, with decreased po intake, and weight loss of five lbs in the last two weeks because of decreased eating and drinking, and has had some feeling of dehydration.  She did not have any recent alcohol intake.  She has no known thyroid disorder. 61 y/o f w/ PMH of mitral valve prolapse, glaucoma, depression p/w palpitations, starting this morning, when she felt her heart was racing, and she did not feel well.  She did not have any concomitant chest pressure, shortness of breath, cough, n/v, diaphoresis.  She also does not have any recent illnesses, fevers, chills.  She has never had symptoms like this before.  She has had worsening glaucoma symptoms over the last few weeks which has caused increased stress and depression.  She initiated a course of prozac over the last 10 days to treat her depression and will be going to her psychiatrist this week.  She is on a diet, with decreased po intake, and weight loss of five lbs in the last two weeks because of decreased eating and drinking, and has had some feeling of dehydration.  She did not have any recent alcohol intake.  She has no known thyroid disorder.  She was treated with low dose toprol xl, continued on aspirin, she refused anticoagulant, echo was ok, she needs to follow up with cardiologist (currently she doesn't have one). 63 y/o f w/ PMH of mitral valve prolapse, glaucoma, depression p/w palpitations, starting this morning, when she felt her heart was racing, and she did not feel well.  She did not have any concomitant chest pressure, shortness of breath, cough, n/v, diaphoresis.  She also does not have any recent illnesses, fevers, chills.  She has never had symptoms like this before.  She has had worsening glaucoma symptoms over the last few weeks which has caused increased stress and depression.  She initiated a course of prozac over the last 10 days to treat her depression and will be going to her psychiatrist this week.  She is on a diet, with decreased po intake, and weight loss of five lbs in the last two weeks because of decreased eating and drinking, and has had some feeling of dehydration.  She did not have any recent alcohol intake.  She has no known thyroid disorder.  She was treated with low dose toprol xl, continued on aspirin, she refused anticoagulant, echo was ok, she needs to follow up with cardiologist (currently she doesn't have one), she will find one. 61 y/o f w/ PMH of mitral valve prolapse, glaucoma, depression p/w palpitations, starting this morning, when she felt her heart was racing, and she did not feel well.  She did not have any concomitant chest pressure, shortness of breath, cough, n/v, diaphoresis.  She also does not have any recent illnesses, fevers, chills.  She has never had symptoms like this before.  She has had worsening glaucoma symptoms over the last few weeks which has caused increased stress and depression.  She initiated a course of prozac over the last 10 days to treat her depression and will be going to her psychiatrist this week.  She is on a diet, with decreased po intake, and weight loss of five lbs in the last two weeks because of decreased eating and drinking, and has had some feeling of dehydration.  She did not have any recent alcohol intake.  She has no known thyroid disorder.  She was treated with low dose toprol xl, continued on aspirin, she refused anticoagulant, echo was ok, she needs to follow up with cardiologist (currently she doesn't have one), she will find one.    Attending Addendum:   Patient seen and examined by me on the discharge day.  More than 30 mins were spent evaluating patient and coordinating care for discharge.   All questions answered in details. Follow up plan explained.   Low LIANNA score. Pt refused AC. refused betablocker.   Remained sinus on tele. Advised to follow up with PCP Dr. Johnson and obdulio lanza.   d/w pt and the  at bedside. d/w BEATRIZ York.

## 2018-10-26 NOTE — PROGRESS NOTE ADULT - NSHPATTENDINGPLANDISCUSS_GEN_ALL_CORE
To reach Cardiology Attending call during weekdays Spectra [phone] or Fellow [phone]. Medicine. To reach Cardiology Attending call during weekdays Spectra [phone] or Fellow [phone].

## 2018-10-26 NOTE — DISCHARGE NOTE ADULT - CARE PLAN
Principal Discharge DX:	Atrial fibrillation  Secondary Diagnosis:	Depression  Secondary Diagnosis:	Glaucoma Principal Discharge DX:	Atrial fibrillation  Secondary Diagnosis:	Depression  Secondary Diagnosis:	Glaucoma  Secondary Diagnosis:	Mitral valve prolapse  Secondary Diagnosis:	Gastritis Principal Discharge DX:	Atrial fibrillation  Goal:	stable  Assessment and plan of treatment:	seen by cardiology and recommend a/c but pt refused and will decide as outpatient, cont aspirin, no need betablocker per cardiology.  Secondary Diagnosis:	Depression  Goal:	stable  Assessment and plan of treatment:	cont current home med  Secondary Diagnosis:	Glaucoma  Goal:	stable  Assessment and plan of treatment:	cont current home eye drops  Secondary Diagnosis:	Mitral valve prolapse  Goal:	stable  Assessment and plan of treatment:	no new med  Secondary Diagnosis:	Gastritis Principal Discharge DX:	Atrial fibrillation  Goal:	stable  Assessment and plan of treatment:	seen by cardiology and recommend a/c but pt refused and will decide as outpatient, cont aspirin, no need betablocker per cardiology. See a cardiologist in 1-2 weeks, and PCP in 3-4 weeks  Secondary Diagnosis:	Depression  Goal:	stable  Assessment and plan of treatment:	cont current home med, see your psychiatrist in 2-3 weeks  Secondary Diagnosis:	Glaucoma  Goal:	stable  Assessment and plan of treatment:	cont current home eye drops  Secondary Diagnosis:	Mitral valve prolapse  Goal:	stable  Assessment and plan of treatment:	no new med  Secondary Diagnosis:	Gastritis  Goal:	stable  Assessment and plan of treatment:	cont ranitidine

## 2018-10-26 NOTE — DISCHARGE NOTE ADULT - PLAN OF CARE
stable seen by cardiology and recommend a/c but pt refused and will decide as outpatient, cont aspirin, no need betablocker per cardiology. cont current home med cont current home eye drops no new med seen by cardiology and recommend a/c but pt refused and will decide as outpatient, cont aspirin, no need betablocker per cardiology. See a cardiologist in 1-2 weeks, and PCP in 3-4 weeks cont current home med, see your psychiatrist in 2-3 weeks cont ranitidine

## 2018-10-26 NOTE — DISCHARGE NOTE ADULT - CARE PROVIDER_API CALL
Rickey Mendieta), Internal Medicine  [address] [place] [address]  Phone: [phone]  Fax: [phone]

## 2018-10-26 NOTE — DISCHARGE NOTE ADULT - MEDICATION SUMMARY - MEDICATIONS TO TAKE
I will START or STAY ON the medications listed below when I get home from the hospital:    aspirin 81 mg oral tablet, chewable  -- 1 tab(s) by mouth once a day  -- Indication: For Need for prophylactic measure    PROzac 20 mg oral capsule  -- 1 cap(s) by mouth once a day  -- Indication: For Depression    raNITIdine 300 mg oral tablet  -- 1 tab(s) by mouth once a day (at bedtime)  -- Indication: For Gastritis    Lumigan 0.01% ophthalmic solution  -- 1 drop(s) to each affected eye once a day (in the evening)  -- Indication: For Glaucoma    Simbrinza 1%- 0.2% ophthalmic suspension  -- 1 drop(s) to each affected eye 3 times a day  -- Indication: For Glaucoma

## 2018-10-26 NOTE — DISCHARGE NOTE ADULT - PATIENT PORTAL LINK FT
You can access the [facility], offered by [facility], by registering with the following website: [email or web address]

## 2018-10-26 NOTE — PROGRESS NOTE ADULT - ASSESSMENT
62F with a history of mitral valve prolapse, depression, glaucoma, GERD who presents with palpitations for the past couple days. Found to be in afib in ED, rate of 80's. Suspect that she has been in afib in the past few weeks.    #AFIB- new onset, CHADSVASC 1. Converted to NSR last night  -continue ASA for now, may transition to AC prior to discharge  -TTE as above, TSH wnl  -monitor on tele overnight, hold on BB for now    Mickey Garibay MD  Cardiology Fellow PGY-5  [id number] 62F with a history of mitral valve prolapse, depression, glaucoma, GERD who presents with palpitations for the past couple days. Found to be in afib in ED, rate of 80's. Suspect that she has been in afib in the past few weeks.    #AFIB- new onset, CHADSVASC 1. Converted to NSR last night  -recommend discharging on eliquis BID however patient would not like to be started on AC right now, would like to think about it and possibly start as outpatient. If so then, d/c on ASA  -TTE as above, TSH wnl  -hold BB for now    Can be discharged from cardiology standpoint    Mickey Garibay MD  Cardiology Fellow PGY-5  [phone]

## 2018-11-02 ENCOUNTER — APPOINTMENT (OUTPATIENT)
Dept: CARDIOLOGY | Facility: CLINIC | Age: 63
End: 2018-11-02
Payer: COMMERCIAL

## 2018-11-02 ENCOUNTER — NON-APPOINTMENT (OUTPATIENT)
Age: 63
End: 2018-11-02

## 2018-11-02 VITALS
WEIGHT: 154 LBS | HEIGHT: 69 IN | HEART RATE: 72 BPM | BODY MASS INDEX: 22.81 KG/M2 | OXYGEN SATURATION: 97 % | SYSTOLIC BLOOD PRESSURE: 120 MMHG | DIASTOLIC BLOOD PRESSURE: 74 MMHG

## 2018-11-02 DIAGNOSIS — Z82.49 FAMILY HISTORY OF ISCHEMIC HEART DISEASE AND OTHER DISEASES OF THE CIRCULATORY SYSTEM: ICD-10-CM

## 2018-11-02 DIAGNOSIS — R03.0 ELEVATED BLOOD-PRESSURE READING, W/OUT DIAGNOSIS OF HYPERTENSION: ICD-10-CM

## 2018-11-02 PROCEDURE — 93224 XTRNL ECG REC UP TO 48 HRS: CPT

## 2018-11-02 PROCEDURE — 99245 OFF/OP CONSLTJ NEW/EST HI 55: CPT

## 2018-11-02 PROCEDURE — 93000 ELECTROCARDIOGRAM COMPLETE: CPT | Mod: 59

## 2018-11-13 ENCOUNTER — APPOINTMENT (OUTPATIENT)
Dept: PSYCHIATRY | Facility: CLINIC | Age: 63
End: 2018-11-13
Payer: COMMERCIAL

## 2018-11-13 ENCOUNTER — APPOINTMENT (OUTPATIENT)
Dept: PSYCHIATRY | Facility: CLINIC | Age: 63
End: 2018-11-13

## 2018-11-13 PROBLEM — H40.9 UNSPECIFIED GLAUCOMA: Chronic | Status: ACTIVE | Noted: 2018-10-25

## 2018-11-13 PROCEDURE — 90792 PSYCH DIAG EVAL W/MED SRVCS: CPT

## 2018-11-14 ENCOUNTER — FORM ENCOUNTER (OUTPATIENT)
Age: 63
End: 2018-11-14

## 2018-11-16 ENCOUNTER — RESULT CHARGE (OUTPATIENT)
Age: 63
End: 2018-11-16

## 2018-11-16 PROCEDURE — 93224 XTRNL ECG REC UP TO 48 HRS: CPT

## 2018-11-25 PROBLEM — Z82.49 FAMILY HISTORY OF HYPERTENSION: Status: ACTIVE | Noted: 2018-11-25

## 2018-11-25 NOTE — PHYSICAL EXAM
[General Appearance - Well Developed] : well developed [Normal Appearance] : normal appearance [Well Groomed] : well groomed [General Appearance - Well Nourished] : well nourished [No Deformities] : no deformities [General Appearance - In No Acute Distress] : no acute distress [Normal Conjunctiva] : the conjunctiva exhibited no abnormalities [Eyelids - No Xanthelasma] : the eyelids demonstrated no xanthelasmas [Normal Oral Mucosa] : normal oral mucosa [No Oral Pallor] : no oral pallor [No Oral Cyanosis] : no oral cyanosis [Normal Jugular Venous A Waves Present] : normal jugular venous A waves present [Normal Jugular Venous V Waves Present] : normal jugular venous V waves present [No Jugular Venous Calix A Waves] : no jugular venous calix A waves [Heart Rate And Rhythm] : heart rate and rhythm were normal [Heart Sounds] : normal S1 and S2 [Murmurs] : no murmurs present [Respiration, Rhythm And Depth] : normal respiratory rhythm and effort [Exaggerated Use Of Accessory Muscles For Inspiration] : no accessory muscle use [Auscultation Breath Sounds / Voice Sounds] : lungs were clear to auscultation bilaterally [Abdomen Soft] : soft [Abdomen Tenderness] : non-tender [Abdomen Mass (___ Cm)] : no abdominal mass palpated [Abnormal Walk] : normal gait [Gait - Sufficient For Exercise Testing] : the gait was sufficient for exercise testing [Nail Clubbing] : no clubbing of the fingernails [Cyanosis, Localized] : no localized cyanosis [Petechial Hemorrhages (___cm)] : no petechial hemorrhages [Skin Color & Pigmentation] : normal skin color and pigmentation [] : no rash [No Venous Stasis] : no venous stasis [Skin Lesions] : no skin lesions [No Skin Ulcers] : no skin ulcer [No Xanthoma] : no  xanthoma was observed [Oriented To Time, Place, And Person] : oriented to person, place, and time [Affect] : the affect was normal [Mood] : the mood was normal [No Anxiety] : not feeling anxious

## 2018-11-26 NOTE — REASON FOR VISIT
[Consultation] : a consultation regarding [FreeTextEntry2] : Consult for : Hypertension. Paroxysmal Atrial Fibrillation. Palpitations/Fatigue.

## 2018-11-26 NOTE — DISCUSSION/SUMMARY
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) - history of Depression, ?Diet-Controlled hypertension, Mitral valve prolapse, Recent Paroxysmal Atrial Fibrillation - presenting for cardiovascular evaluation\par -------------\par Paroxysmal Atrial Fibrillation\par - Discussed that the rapid rates in AF/ATac and the slower rates in sinus make it a difficult to treat problem\par - I will refer to Nadir Benavides for consideration of rhythm control options vs PPM\par              - ablation, antiarrhythmics, etc (although slower sinus ventricular rates still complicates consideration of antiarrhythmics).\par              - may need PPM\par - Discussed that my recommendation for AC is systemic anticoagulation, but she prefers ASA for now\par - Discussed that she likely also has a component of autonomic hypersensitivity and staying hydrated, antidepressants, as well as exercise is the treatment of choice. \par -  Will follow up with her after her appointment with Dr. Benavides\par          \par \par \par \par \par \par \par \par \par \par \par \par \par \par Hypertension (Stable; Chronic). pAF (Stable; Chronic). Palpitations (Stable; Chronic). Medication plan for these conditions noted above. \par Total Time Spent in face-to-face encounter was 80 minutes. >50% time spent in counseling and coordination of care and on addressing above medical conditions in assessment.\par All labs, imaging, consulting reports, and any relevant outside records including laboratory work personally reviewed in order to evaluate, manage, and coordinate care amongst providers.  Patient-Risk (High).\par Counseled on diet, exercise, cardiovascular risk reduction for > 15 minutes.\par

## 2018-11-26 NOTE — HISTORY OF PRESENT ILLNESS
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 No associated pre-syncope/syncope/CP/SOB/N/V/Diaphoresis/F/C. Never previously had it before. \par She reports not eating or drinking much the day prior to the episode. \par Also, recently started Prozac 2 weeks prior to episode. \par No known thyroid disorder.\par She was started on Toprol 25 XL, given ASA (she did not want systemic anticoagulation), had a normal TTE, and was sent home\par She now reports doing well, except is profoundly fatigued. \par On further questioning, it seems she is hypersensitive to small changes in both lifestyle or medication. \par Following the visit, I did a Holter monitor demonstrating a 10% AF burden, ATac/SVT, some rates went into the high 100s; in SR, she has a minimum heart rate in the 50s. \par We discussed that treating the fast HRs in atrial fibrillation is complicated by the slower HRs in sinus rhythm. We also discussed the pros/cons of anticoagulation. Her [id number] is 1-2 (if her hypertension is a valid diagnosis). I favored treating with AC; but she prefers high-dose ASA. \par - Autonomic hypersensitivity '\par - notice small changees. \par - dehydration\par - Holter\par - still with AF on Holter 10/2018\par    - Fast HRs in AF\par    - Slow Minimum HR in Sinus\par          - Call Mon. Consider EP referral \par \par \par \par =====================\par TTE\par Conclusions:\par 1. Normal left ventricular internal dimensions and wall\par thicknesses.\par 2. Normal left ventricular systolic function. Endocardial\par visualization enhanced with intravenous injection of\par Ultrasonic Enhancing Agent (Definity).\par 3. The right ventricle is not well visualized; grossly\par normal right ventricular systolic function. TVs' = 10\par cm/sec.\par 4. Inadequate tricuspid regurgitation Doppler envelope\par precludes estimation of RVSP.\par *** No previous Echo exam.\par ------------------------------------------------------------------------\par Confirmed on  10/25/2018 - 17:08:08 by karen Reapar ------------------------------------------------------------------------

## 2018-11-26 NOTE — REVIEW OF SYSTEMS
[see HPI] : see HPI [Feeling Fatigued] : feeling fatigued [Palpitations] : palpitations [Negative] : Heme/Lymph [Fever] : no fever [Chills] : no chills [Shortness Of Breath] : no shortness of breath [Chest Pain] : no chest pain

## 2018-12-03 ENCOUNTER — NON-APPOINTMENT (OUTPATIENT)
Age: 63
End: 2018-12-03

## 2018-12-03 ENCOUNTER — APPOINTMENT (OUTPATIENT)
Dept: ELECTROPHYSIOLOGY | Facility: CLINIC | Age: 63
End: 2018-12-03
Payer: COMMERCIAL

## 2018-12-03 VITALS
BODY MASS INDEX: 22.66 KG/M2 | DIASTOLIC BLOOD PRESSURE: 83 MMHG | SYSTOLIC BLOOD PRESSURE: 143 MMHG | OXYGEN SATURATION: 97 % | HEART RATE: 71 BPM | HEIGHT: 69 IN | WEIGHT: 153 LBS

## 2018-12-03 PROCEDURE — 99203 OFFICE O/P NEW LOW 30 MIN: CPT

## 2018-12-03 PROCEDURE — 93000 ELECTROCARDIOGRAM COMPLETE: CPT

## 2018-12-03 RX ORDER — CEPHALEXIN 500 MG/1
500 CAPSULE ORAL
Qty: 10 | Refills: 0 | Status: DISCONTINUED | COMMUNITY
Start: 2018-05-14 | End: 2018-12-03

## 2018-12-03 RX ORDER — DOXYCYCLINE HYCLATE 50 MG/1
50 CAPSULE ORAL TWICE DAILY
Qty: 180 | Refills: 0 | Status: DISCONTINUED | COMMUNITY
Start: 2017-10-11 | End: 2018-12-03

## 2018-12-04 NOTE — REASON FOR VISIT
[Initial Evaluation] : an initial evaluation of [Atrial Fibrillation] : atrial fibrillation [Spouse] : spouse

## 2018-12-05 ENCOUNTER — EMERGENCY (EMERGENCY)
Facility: HOSPITAL | Age: 63
LOS: 1 days | Discharge: HOME | End: 2018-12-05
Attending: EMERGENCY MEDICINE
Payer: COMMERCIAL

## 2018-12-05 VITALS
SYSTOLIC BLOOD PRESSURE: 153 MMHG | WEIGHT: 149.03 LBS | TEMPERATURE: 98 F | HEART RATE: 84 BPM | HEIGHT: 69 IN | DIASTOLIC BLOOD PRESSURE: 87 MMHG | OXYGEN SATURATION: 97 % | RESPIRATION RATE: 18 BRPM

## 2018-12-05 VITALS
HEART RATE: 60 BPM | OXYGEN SATURATION: 99 % | SYSTOLIC BLOOD PRESSURE: 112 MMHG | DIASTOLIC BLOOD PRESSURE: 64 MMHG | RESPIRATION RATE: 16 BRPM

## 2018-12-05 LAB
ALBUMIN SERPL ELPH-MCNC: 4.2 G/DL — SIGNIFICANT CHANGE UP (ref 3.3–5)
ALP SERPL-CCNC: 94 U/L — SIGNIFICANT CHANGE UP (ref 40–120)
ALT FLD-CCNC: 28 U/L — SIGNIFICANT CHANGE UP (ref 10–45)
ANION GAP SERPL CALC-SCNC: 15 MMOL/L — SIGNIFICANT CHANGE UP (ref 5–17)
APTT BLD: 29.5 SEC — SIGNIFICANT CHANGE UP (ref 27.5–36.3)
AST SERPL-CCNC: 18 U/L — SIGNIFICANT CHANGE UP (ref 10–40)
BASOPHILS # BLD AUTO: 0.1 K/UL — SIGNIFICANT CHANGE UP (ref 0–0.2)
BASOPHILS NFR BLD AUTO: 1.2 % — SIGNIFICANT CHANGE UP (ref 0–2)
BILIRUB SERPL-MCNC: 1.1 MG/DL — SIGNIFICANT CHANGE UP (ref 0.2–1.2)
BUN SERPL-MCNC: 16 MG/DL — SIGNIFICANT CHANGE UP (ref 7–23)
CALCIUM SERPL-MCNC: 9.2 MG/DL — SIGNIFICANT CHANGE UP (ref 8.4–10.5)
CHLORIDE SERPL-SCNC: 103 MMOL/L — SIGNIFICANT CHANGE UP (ref 96–108)
CO2 SERPL-SCNC: 18 MMOL/L — LOW (ref 22–31)
CREAT SERPL-MCNC: 0.75 MG/DL — SIGNIFICANT CHANGE UP (ref 0.5–1.3)
EOSINOPHIL # BLD AUTO: 0.2 K/UL — SIGNIFICANT CHANGE UP (ref 0–0.5)
EOSINOPHIL NFR BLD AUTO: 2 % — SIGNIFICANT CHANGE UP (ref 0–6)
GLUCOSE SERPL-MCNC: 108 MG/DL — HIGH (ref 70–99)
HCT VFR BLD CALC: 39.6 % — SIGNIFICANT CHANGE UP (ref 34.5–45)
HGB BLD-MCNC: 14.1 G/DL — SIGNIFICANT CHANGE UP (ref 11.5–15.5)
INR BLD: 1.07 RATIO — SIGNIFICANT CHANGE UP (ref 0.88–1.16)
LYMPHOCYTES # BLD AUTO: 1.8 K/UL — SIGNIFICANT CHANGE UP (ref 1–3.3)
LYMPHOCYTES # BLD AUTO: 21.4 % — SIGNIFICANT CHANGE UP (ref 13–44)
MCHC RBC-ENTMCNC: 32.3 PG — SIGNIFICANT CHANGE UP (ref 27–34)
MCHC RBC-ENTMCNC: 35.5 GM/DL — SIGNIFICANT CHANGE UP (ref 32–36)
MCV RBC AUTO: 90.9 FL — SIGNIFICANT CHANGE UP (ref 80–100)
MONOCYTES # BLD AUTO: 0.6 K/UL — SIGNIFICANT CHANGE UP (ref 0–0.9)
MONOCYTES NFR BLD AUTO: 7 % — SIGNIFICANT CHANGE UP (ref 2–14)
NEUTROPHILS # BLD AUTO: 5.7 K/UL — SIGNIFICANT CHANGE UP (ref 1.8–7.4)
NEUTROPHILS NFR BLD AUTO: 68.5 % — SIGNIFICANT CHANGE UP (ref 43–77)
PLATELET # BLD AUTO: 334 K/UL — SIGNIFICANT CHANGE UP (ref 150–400)
POTASSIUM SERPL-MCNC: 3.5 MMOL/L — SIGNIFICANT CHANGE UP (ref 3.5–5.3)
POTASSIUM SERPL-SCNC: 3.5 MMOL/L — SIGNIFICANT CHANGE UP (ref 3.5–5.3)
PROT SERPL-MCNC: 7.4 G/DL — SIGNIFICANT CHANGE UP (ref 6–8.3)
PROTHROM AB SERPL-ACNC: 12.3 SEC — SIGNIFICANT CHANGE UP (ref 10–12.9)
RBC # BLD: 4.36 M/UL — SIGNIFICANT CHANGE UP (ref 3.8–5.2)
RBC # FLD: 12.4 % — SIGNIFICANT CHANGE UP (ref 10.3–14.5)
SODIUM SERPL-SCNC: 136 MMOL/L — SIGNIFICANT CHANGE UP (ref 135–145)
WBC # BLD: 8.3 K/UL — SIGNIFICANT CHANGE UP (ref 3.8–10.5)
WBC # FLD AUTO: 8.3 K/UL — SIGNIFICANT CHANGE UP (ref 3.8–10.5)

## 2018-12-05 PROCEDURE — 85027 COMPLETE CBC AUTOMATED: CPT

## 2018-12-05 PROCEDURE — 85652 RBC SED RATE AUTOMATED: CPT

## 2018-12-05 PROCEDURE — 80053 COMPREHEN METABOLIC PANEL: CPT

## 2018-12-05 PROCEDURE — 96368 THER/DIAG CONCURRENT INF: CPT

## 2018-12-05 PROCEDURE — 96375 TX/PRO/DX INJ NEW DRUG ADDON: CPT

## 2018-12-05 PROCEDURE — 93005 ELECTROCARDIOGRAM TRACING: CPT

## 2018-12-05 PROCEDURE — 70450 CT HEAD/BRAIN W/O DYE: CPT | Mod: 26

## 2018-12-05 PROCEDURE — 99285 EMERGENCY DEPT VISIT HI MDM: CPT | Mod: 25

## 2018-12-05 PROCEDURE — 96365 THER/PROPH/DIAG IV INF INIT: CPT

## 2018-12-05 PROCEDURE — 70450 CT HEAD/BRAIN W/O DYE: CPT

## 2018-12-05 PROCEDURE — 96367 TX/PROPH/DG ADDL SEQ IV INF: CPT

## 2018-12-05 PROCEDURE — 85610 PROTHROMBIN TIME: CPT

## 2018-12-05 PROCEDURE — 99284 EMERGENCY DEPT VISIT MOD MDM: CPT | Mod: 25

## 2018-12-05 PROCEDURE — 85730 THROMBOPLASTIN TIME PARTIAL: CPT

## 2018-12-05 RX ORDER — KETOROLAC TROMETHAMINE 30 MG/ML
30 SYRINGE (ML) INJECTION ONCE
Qty: 0 | Refills: 0 | Status: DISCONTINUED | OUTPATIENT
Start: 2018-12-05 | End: 2018-12-05

## 2018-12-05 RX ORDER — PROCHLORPERAZINE MALEATE 5 MG
10 TABLET ORAL ONCE
Qty: 0 | Refills: 0 | Status: COMPLETED | OUTPATIENT
Start: 2018-12-05 | End: 2018-12-05

## 2018-12-05 RX ORDER — SODIUM CHLORIDE 9 MG/ML
1000 INJECTION INTRAMUSCULAR; INTRAVENOUS; SUBCUTANEOUS ONCE
Qty: 0 | Refills: 0 | Status: COMPLETED | OUTPATIENT
Start: 2018-12-05 | End: 2018-12-05

## 2018-12-05 RX ORDER — CAFFEINE 200 MG
60 TABLET ORAL ONCE
Qty: 0 | Refills: 0 | Status: COMPLETED | OUTPATIENT
Start: 2018-12-05 | End: 2018-12-05

## 2018-12-05 RX ORDER — MAGNESIUM SULFATE 500 MG/ML
2 VIAL (ML) INJECTION ONCE
Qty: 0 | Refills: 0 | Status: COMPLETED | OUTPATIENT
Start: 2018-12-05 | End: 2018-12-05

## 2018-12-05 RX ORDER — DIPHENHYDRAMINE HCL 50 MG
25 CAPSULE ORAL ONCE
Qty: 0 | Refills: 0 | Status: COMPLETED | OUTPATIENT
Start: 2018-12-05 | End: 2018-12-05

## 2018-12-05 RX ORDER — METOCLOPRAMIDE HCL 10 MG
10 TABLET ORAL ONCE
Qty: 0 | Refills: 0 | Status: COMPLETED | OUTPATIENT
Start: 2018-12-05 | End: 2018-12-05

## 2018-12-05 RX ORDER — VALPROIC ACID (AS SODIUM SALT) 250 MG/5ML
500 SOLUTION, ORAL ORAL ONCE
Qty: 0 | Refills: 0 | Status: COMPLETED | OUTPATIENT
Start: 2018-12-05 | End: 2018-12-05

## 2018-12-05 RX ORDER — ACETAMINOPHEN 500 MG
1000 TABLET ORAL ONCE
Qty: 0 | Refills: 0 | Status: COMPLETED | OUTPATIENT
Start: 2018-12-05 | End: 2018-12-05

## 2018-12-05 RX ADMIN — SODIUM CHLORIDE 1000 MILLILITER(S): 9 INJECTION INTRAMUSCULAR; INTRAVENOUS; SUBCUTANEOUS at 10:05

## 2018-12-05 RX ADMIN — SODIUM CHLORIDE 1000 MILLILITER(S): 9 INJECTION INTRAMUSCULAR; INTRAVENOUS; SUBCUTANEOUS at 06:59

## 2018-12-05 RX ADMIN — Medication 27.5 MILLIGRAM(S): at 11:49

## 2018-12-05 RX ADMIN — Medication 400 MILLIGRAM(S): at 06:59

## 2018-12-05 RX ADMIN — Medication 1000 MILLIGRAM(S): at 09:04

## 2018-12-05 RX ADMIN — Medication 30 MILLIGRAM(S): at 06:59

## 2018-12-05 RX ADMIN — Medication 25 MILLIGRAM(S): at 07:00

## 2018-12-05 RX ADMIN — Medication 500 MILLIGRAM(S): at 13:17

## 2018-12-05 RX ADMIN — SODIUM CHLORIDE 2000 MILLILITER(S): 9 INJECTION INTRAMUSCULAR; INTRAVENOUS; SUBCUTANEOUS at 09:03

## 2018-12-05 RX ADMIN — Medication 50 GRAM(S): at 09:03

## 2018-12-05 RX ADMIN — Medication 10 MILLIGRAM(S): at 06:59

## 2018-12-05 RX ADMIN — Medication 60 MILLIGRAM(S): at 10:35

## 2018-12-05 RX ADMIN — Medication 2 GRAM(S): at 10:05

## 2018-12-05 RX ADMIN — Medication 10 MILLIGRAM(S): at 10:34

## 2018-12-05 NOTE — CONSULT NOTE ADULT - ASSESSMENT
61 y/o Female with past medical history of A-fib on Eliquis, Migraines presents to the ED with persistent left sided headache since Sunday (12/2/2018). Patient states that she began experiencing a sharp left sided headache on Sunday rated 10/10 in intensity. Patient states that initially, the headache was similar to her prior migraines. Patient attempted to drink some caffeinated green tea and took sumatriptan with minimal relief. Patient states that the headache decreased in intensity to about 5/10 but then ramped back to 10/10 in a few hours. Patient states this cycle of headache severity continued for 4 days prompting her to come to the ED. Patient admits to accompanying nausea and an episode of vomiting however, denies photophobia, phonophobia, neck stiffness or other focal neurologic deficits. Patient states she typically has migraines 7-8 times a month which usually abort with caffeinated green tea and imitrex. Patient states she occasionally does have nausea with her migraines however this occurs "rarely." Patient does identify that she has recently been very stressed about her father's worsening dementia and has been cutting back on caffeine due to recently diagnosed A-fib (10/2018). Patient also admit to currently fighting off an URI.   At the time of my interview, patient reports her migraines is now 1/10 and she wants to go home. Neurologic exam unremarkable. CT head unremarkable.     Impression: Complex Migraine likely 2/2 lifestyle changes and stressors    Recommendations:   [] Follow-up with outpatient Neurologist, Dr. Bolton on 12/6 at 9am as scheduled.     Plan discussed with Neurology Attending.

## 2018-12-05 NOTE — ED PROVIDER NOTE - ATTENDING CONTRIBUTION TO CARE
62 yof pmhx known migraines, recent dx a fib started on eliquis and propranolol two days ago, presnts w ongoing headache. pt states she gets relatively freq migraines usually resolved w imitrex; this episode starte three days ago (prior to onset new a fib) and did not resolve w usual imitrex and caffeine. bilat temporal, severe, mild pain to neck and left trapezius area diffusely; no f/c, no vision changes. states is similar in character to prev however is lasting longer; wondering if it has anything to do w the new medication. has seen neuro carol ann int he past and has had mri prev w/o abnromality found. states has been under some stress recently and has had mild cold /uri sxs    ROS:   constitutional - no fever, no chills  eyes - no visual changes, no redness  eent - no sore throat, no nasal congestion  cvs - no chest pain, no leg swelling  resp - no shortness of breath, no cough  gi - no abdominal pain, no vomiting, no diarrhea  gu - no dysuria, no hematuria  msk - no acute back pain, no joint swelling  skin - no rashes, no jaundice  neuro - + headache, no focal weakness  psych - no acute mental health issue     Physical Exam:   constitutional - mild distress due to pain, awake and alert, oriented x3  head - no external evidence of trauma  cvs - rrr, no murmurs, no peripheral edema  resp - breath sounds clear and equal bilat  gi - abdomen soft and nontender, no rigidity, guarding or rebound, bowel sounds present  msk - moving all extremities spontaneously  neuro - alert and oriented x3, no focal deficits, CNs 2-12 grossly intact, gait stable, no pronator drift. no signif tenderness to temporal areas. eoms intact. strength 5/5 in all ext   skin- no jaundice, warm and dry  psych - mood and affect wnl, no apparent risk to self or others     likely migriane, however will do ct as pain lasting longer than usual, and has now started ac. doubt temporal arteritis, [facility] will send esr. gradual onset of pain not c/w sah. endorsed to dr Rausch at 0730 pending reassessment, ct, and likely neuro eval. NENO Bowles MD

## 2018-12-05 NOTE — ED PROVIDER NOTE - PROGRESS NOTE DETAILS
Kerri: Patient still with headache though mildly improved. Will provide adjunctive therapy. Left message for Dr. Bolton. Kerri: Spoke with neuro c/s who will see the patient. Kerri: Patient much improved; awaiting neurology recs.

## 2018-12-05 NOTE — ED PROVIDER NOTE - PHYSICAL EXAMINATION
VITALS: reviewed  GEN: NAD, A & O x 4  HEAD/EYES: NCAT, PERRL, EOMI, anicteric sclerae, no conjunctival pallor  ENT: mucus membranes moist, oropharynx WNL, trachea midline, no JVD  RESP: lungs CTA with equal breath sounds bilaterally, chest wall nontender and atraumatic  CV: heart with reg rhythm S1, S2; distal pulses intact and symmetric bilaterally  ABDOMEN: normoactive bowel sounds, soft, nondistended, nontender, no palpable masses  : no CVAT  MSK: extremities atraumatic and nontender, no edema, no asymmetry. the back is without midline or lateral tenderness, there is no spinal deformity or stepoff and the back is ranged painlessly. the neck has no midline tenderness, deformity, or stepoff, and is ranged painlessly.  SKIN: warm, dry, no rash, no bruising, no cyanosis. color appropriate for ethnicity  NEURO: alert, mentating appropriately, no facial asymmetry. gross sensation, motor, coordination are intact  PSYCH: Affect appropriate

## 2018-12-05 NOTE — CONSULT NOTE ADULT - SUBJECTIVE AND OBJECTIVE BOX
EFRA [id number] is a 62y old  Female who presents with a chief complaint of     HPI: 61 y/o Female with past medical history of A-fib on Eliquis, Migraines presents to the ED with persistent left sided headache since Sunday (12/2/2018). Patient states that she began experiencing a sharp left sided headache on Sunday rated 10/10 in intensity. Patient states that initially, the headache was similar to her prior migraines. Patient attempted to drink some caffeinated green tea and took sumatriptan with minimal relief. Patient states that the headache decreased in intensity to about 5/10 but then ramped back to 10/10 in a few hours. Patient states this cycle of headache severity continued for 4 days prompting her to come to the ED. Patient admits to accompanying nausea and an episode of vomiting however, denies photophobia, phonophobia, neck stiffness or other focal neurologic deficits. Patient states she typically has migraines 7-8 times a month which usually abort with caffeinated green tea and imitrex. Patient states she occasionally does have nausea with her migraines however this occurs "rarely." Patient does identify that she has recently been very stressed about her father's worsening dementia and has been cutting back on caffeine due to recently diagnosed A-fib (10/2018). Patient also admit to currently fighting off an URI.   At the time of my interview, patient reports her migraines is now 1/10 and she wants to go home.     MEDICATIONS  (STANDING):    MEDICATIONS  (PRN):    PAST MEDICAL & SURGICAL HISTORY:  Glaucoma  Heart Murmur: Denies syncope, denies dizziness, denies palpitations.  last echo done n2008  Gastritis  Migraine Headache  cholecystectomy: 1989  Status Post partial  Left Oophorectomy: 1982    FAMILY HISTORY:  Family history of atrial fibrillation (Father)    Allergies    Ultram (Vomiting)  Yeast, foods containing tannin, citrus, cholocate, aged cheese, tyramine all cause migraine headaches (Headache)    Intolerances        SHx - No smoking, No ETOH, No drug abuse      Review of Systems:  As per HPI, otherwise negative.     Vital Signs Last 24 Hrs  T(C): 36.5 (05 Dec 2018 11:40), Max: 36.8 (05 Dec 2018 09:00)  T(F): 97.7 (05 Dec 2018 11:40), Max: 98.2 (05 Dec 2018 09:00)  HR: 60 (05 Dec 2018 13:19) (57 - 84)  BP: 112/64 (05 Dec 2018 13:19) (112/64 - 153/87)  BP(mean): --  RR: 16 (05 Dec 2018 13:19) (16 - 18)  SpO2: 99% (05 Dec 2018 13:19) (97% - 100%)    General Exam:   General appearance: No acute distress        Neurological Exam:  Mental Status: Orientated to self, date and place.  Attention intact.  No dysarthria. Speech fluent.  Cranial Nerves:   PERRL, EOMI, VFF, no nystagmus. CN V1-3 intact to light touch. No facial asymmetry. Hearing intact to finger rub bilaterally.  Tongue, uvula and palate midline.  Sternocleidomastoid and Trapezius intact bilaterally.    Motor:   Tone: normal.                  Strength:     [] Upper extremity                      Delt       Bicep    Tricep                                                  R         5/5        5/5        5/5       5/5                                               L          5/5        5/5        5/5       5/5  [] Lower extremity                       HF          KE          KF        DF         PF                                               R        5/5        5/5        5/5       5/5       5/5                                               L         5/5        5/5       5/5       5/5        5/5  Pronator drift: none                 Dysmetria: None to finger-nose-finger or heel-shin-heel  No truncal ataxia.    Tremor: No resting, postural or action tremor.  No myoclonus.    Sensation: intact to light touch    Gait: normal.      Other:    12-05    136  |  103  |  16  ----------------------------<  108<H>  3.5   |  18<L>  |  0.75    Ca    9.2      05 Dec 2018 06:57    TPro  7.4  /  Alb  4.2  /  TBili  1.1  /  DBili  x   /  AST  18  /  ALT  28  /  AlkPhos  94  12-05                            14.1   8.3   )-----------( 334      ( 05 Dec 2018 06:57 )             39.6       Radiology    CT: < from: CT Head No Cont (12.05.18 @ 08:37) >  Impression:    No evidence of acute hemorrhage mass or mass effect.      < end of copied text >    MRI  EKG:  tele:  TTE:  EEG:

## 2018-12-05 NOTE — ED PROVIDER NOTE - OBJECTIVE STATEMENT
63 yo F hx migraines, recently dx a fib (started on eliquis on Mon- took one dose, scheduled for ablation), presenting with L sided throbbing headache x 4 days 9/10, lasting longer than typical migraines. Associated L arm pain, which she has had for the past year. Nausea, vomiting, photophobia. No trauma/falls. Typically takes Imitrex at onset of headache with relief after a few hours. Headaches several times a week. Recently cut caffeine intake. Tried Tylenol, oxycodone, and caffeinated tea at home without relief. Pain 9/10. Unable to sleep. No cp/sob. No numbness/tingling.

## 2018-12-05 NOTE — ED ADULT NURSE NOTE - NSIMPLEMENTINTERV_GEN_ALL_ED
Implemented All Universal Safety Interventions:  [place] to call system. Call bell, personal items and telephone within reach. Instruct patient to call for assistance. Room bathroom lighting operational. Non-slip footwear when patient is off stretcher. Physically safe environment: no spills, clutter or unnecessary equipment. Stretcher in lowest position, wheels locked, appropriate side rails in place.

## 2018-12-05 NOTE — ED ADULT NURSE REASSESSMENT NOTE - NS ED NURSE REASSESS COMMENT FT1
Pt returned from CT. Pt states partial relief from migraine after meds. Pt denies vision changes. Gross Neuro intact. Speech clear, no facial droop noted. AQUINO. VSS. Pt given MGSulfate 2mg as per MD orders via #20G [facility], site benign, +Blood return. NAD noted. Pending CT results. Spouse at bedside. Plan of care discussed. Will continue to monitor.

## 2018-12-05 NOTE — ED PROVIDER NOTE - NSFOLLOWUPINSTRUCTIONS_ED_ALL_ED_FT
Continue to take your home medications as prescribed  You can take ibuprofen or tylenol as needed for further pain.  Please follow-up with Dr. Bolton tomorrow as planned.

## 2018-12-05 NOTE — ED PROVIDER NOTE - NS ED ROS FT
CONST: no fevers, no chills, no trauma  EYES: no pain, no visual disturbances  ENT: no sore throat, no epistaxis, no rhinorrhea, no hearing changes  CV: no chest pain, no palpitations, no orthopnea, no extremity pain or swelling  RESP: no shortness of breath, no cough, no sputum, no pleurisy, no wheezing  ABD: no abdominal pain, no nausea, no vomiting, no diarrhea, no black or bloody stool  : no dysuria, no hematuria, no frequency, no urgency  MSK: no back pain, no neck pain, + extremity pain  NEURO: + headache, no sensory disturbances, no focal weakness, no dizziness  HEME: no easy bleeding or bruising  SKIN: no diaphoresis, no rash

## 2018-12-05 NOTE — ED ADULT NURSE NOTE - CHPI ED NUR SYMPTOMS NEG
no loss of consciousness/no change in level of consciousness/no confusion/no numbness/no blurred vision/no weakness/no dizziness/no fever

## 2018-12-05 NOTE — ED ADULT NURSE NOTE - OBJECTIVE STATEMENT
62y female presents to ED complaining of a migraine. Pt is a/ox3 states that since Sunday she has had a migraine that was not relieved with Imitrex or Tylenol. Pt states she has a hx of migraines and has a recent diagnosis of Afib started on Eliquis and propanolol on Monday, told she is in afib "10% of the time". PT states she also has had cold symptoms for 1 week with congestion and runny nose. Pt states the headache is mostly L sided on the top of her head accompanied with cough and vomiting. PT states she has had decreased PO intake due to the vomiting. Pt states she has had increased stressors in her life over the last month. Pt breathing spontaneous and unlabored with lungs clear to auscultation bilaterally. Pt skin is warm, dry and intact with no edema present. Pt abdomen soft, nontender, nondistended. Pt PERRL, with equal strength bilaterally in upper and lower extremities with full sensation. Pt denies chest pain and SOB, denies fever/chills, denies dysuria, denies numbness/tingling and weakness.

## 2018-12-06 ENCOUNTER — APPOINTMENT (OUTPATIENT)
Dept: PAIN MANAGEMENT | Facility: CLINIC | Age: 63
End: 2018-12-06
Payer: COMMERCIAL

## 2018-12-06 VITALS
WEIGHT: 153 LBS | SYSTOLIC BLOOD PRESSURE: 126 MMHG | HEART RATE: 72 BPM | DIASTOLIC BLOOD PRESSURE: 70 MMHG | BODY MASS INDEX: 22.66 KG/M2 | HEIGHT: 69 IN

## 2018-12-06 DIAGNOSIS — H40.9 UNSPECIFIED GLAUCOMA: ICD-10-CM

## 2018-12-06 PROCEDURE — 99215 OFFICE O/P EST HI 40 MIN: CPT

## 2018-12-06 NOTE — DISCUSSION/SUMMARY
[FreeTextEntry1] : In summary the patient is a 62 year old paroxysmal atrial fibrilation and at times rapid ventricular response. In sinus rhythm she has some degree of bradycardia. We discussed the underlying pathophysiology of atrial fibrillation atrial tachycardia length. We discussed treatment options with medication and with catheter ablation. I am somewhat concerned if we went a medical route that she may require a pacemaker. We discussed the procedures, outcomes and risks of ablation at length. The risks discussed included but were not limited to bleeding, vascular damage, cardiac perforation, cardiac tamponade, stroke, pulmonary vein stenosis, and atrial esophageal fistula. We also discussed the role of anticoagulation. Aspirin has no value in preventing stroke in atrial fibrillation and only increases the bleeding risk. She does have a risk worth 2 for her hypertension and female gender. The patient has agreed to start Apixiban. In addition she would like to proceed with ablation. Until them she will start Inderal LA to help control rates (and perhaps help her migraines).

## 2018-12-06 NOTE — HISTORY OF PRESENT ILLNESS
[FreeTextEntry1] : I had the pleasure of seeing your patient Allyssa Quezada today in the arrhythmia clinic of [facility]. 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 The patient had presented to the emergency room in October with a few week history of palpitations or racing heart. It was not associated with any presyncope or syncope nor did she have chest pain, shortness of breath or diaphoresis. This was new to her. She reported not eating or drinking the day prior to the episode. She was also recently started on Prozac. She was started on metoprolol XL 25 mg and given aspirin. A workup included a normal echocardiogram and she was discharged home. On followup a Holter monitor demonstrated a 10% atrial fibrillation burden as well as some episodes of atrial tachycardia with rates in the high 100s. Her minimum heart rates were in the 50s. It was recommended that she start on anticoagulation but she chose not to.\par \par Echocardiogram: Normal left ventricular size and function, mitral valve prolapse\par \par The patient comes into clinic today feeling quite unwell. She is having a fairly severe migraine and has a bad upper respiratory infection. She usually uses caffeine for her migraines but has been avoiding this due to the nature fibrillation. In the past the patient had identified alcohol especially wine would produce her episodes. She is therefore limited at this period over the last couple of months she has seen an increase in the number of episodes. This past week on Monday she was in atrial fibrillation for 6-7 hours. With these episodes she feels very fatigued jittery and slightly lightheaded. In the past she had been tried on beta blockers which had caused nightmares. Today in clinic her blood pressure is 143/83 her pulse was 71 and regular. Her EKG demonstrated sinus rhythm with left atrial enlargement and an RSR prime pattern at a rate of 72 beats per minute.\par

## 2018-12-14 ENCOUNTER — APPOINTMENT (OUTPATIENT)
Dept: PSYCHIATRY | Facility: CLINIC | Age: 63
End: 2018-12-14
Payer: COMMERCIAL

## 2018-12-14 PROCEDURE — 99213 OFFICE O/P EST LOW 20 MIN: CPT

## 2018-12-21 ENCOUNTER — TRANSCRIPTION ENCOUNTER (OUTPATIENT)
Age: 63
End: 2018-12-21

## 2019-01-02 ENCOUNTER — OUTPATIENT (OUTPATIENT)
Dept: OUTPATIENT SERVICES | Facility: HOSPITAL | Age: 64
LOS: 1 days | End: 2019-01-02

## 2019-01-02 ENCOUNTER — APPOINTMENT (OUTPATIENT)
Dept: CV DIAGNOSITCS | Facility: HOSPITAL | Age: 64
End: 2019-01-02

## 2019-01-02 ENCOUNTER — OUTPATIENT (OUTPATIENT)
Dept: OUTPATIENT SERVICES | Facility: HOSPITAL | Age: 64
LOS: 1 days | End: 2019-01-02
Payer: COMMERCIAL

## 2019-01-02 VITALS
TEMPERATURE: 99 F | DIASTOLIC BLOOD PRESSURE: 59 MMHG | SYSTOLIC BLOOD PRESSURE: 129 MMHG | HEART RATE: 59 BPM | WEIGHT: 154.98 LBS | OXYGEN SATURATION: 97 % | RESPIRATION RATE: 18 BRPM | HEIGHT: 69 IN

## 2019-01-02 DIAGNOSIS — Z01.818 ENCOUNTER FOR OTHER PREPROCEDURAL EXAMINATION: ICD-10-CM

## 2019-01-02 DIAGNOSIS — I49.9 CARDIAC ARRHYTHMIA, UNSPECIFIED: ICD-10-CM

## 2019-01-02 LAB
ANION GAP SERPL CALC-SCNC: 12 MMOL/L — SIGNIFICANT CHANGE UP (ref 5–17)
APTT BLD: 33 SEC — SIGNIFICANT CHANGE UP (ref 27.5–36.3)
BLD GP AB SCN SERPL QL: NEGATIVE — SIGNIFICANT CHANGE UP
BUN SERPL-MCNC: 13 MG/DL — SIGNIFICANT CHANGE UP (ref 7–23)
CALCIUM SERPL-MCNC: 9.2 MG/DL — SIGNIFICANT CHANGE UP (ref 8.4–10.5)
CHLORIDE SERPL-SCNC: 104 MMOL/L — SIGNIFICANT CHANGE UP (ref 96–108)
CO2 SERPL-SCNC: 21 MMOL/L — LOW (ref 22–31)
CREAT SERPL-MCNC: 0.86 MG/DL — SIGNIFICANT CHANGE UP (ref 0.5–1.3)
GLUCOSE SERPL-MCNC: 101 MG/DL — HIGH (ref 70–99)
HCT VFR BLD CALC: 36.9 % — SIGNIFICANT CHANGE UP (ref 34.5–45)
HGB BLD-MCNC: 13 G/DL — SIGNIFICANT CHANGE UP (ref 11.5–15.5)
INR BLD: 0.99 RATIO — SIGNIFICANT CHANGE UP (ref 0.88–1.16)
MCHC RBC-ENTMCNC: 32.7 PG — SIGNIFICANT CHANGE UP (ref 27–34)
MCHC RBC-ENTMCNC: 35.2 GM/DL — SIGNIFICANT CHANGE UP (ref 32–36)
MCV RBC AUTO: 92.9 FL — SIGNIFICANT CHANGE UP (ref 80–100)
PLATELET # BLD AUTO: 314 K/UL — SIGNIFICANT CHANGE UP (ref 150–400)
POTASSIUM SERPL-MCNC: 4.2 MMOL/L — SIGNIFICANT CHANGE UP (ref 3.5–5.3)
POTASSIUM SERPL-SCNC: 4.2 MMOL/L — SIGNIFICANT CHANGE UP (ref 3.5–5.3)
PROTHROM AB SERPL-ACNC: 11.4 SEC — SIGNIFICANT CHANGE UP (ref 10–12.9)
RBC # BLD: 3.98 M/UL — SIGNIFICANT CHANGE UP (ref 3.8–5.2)
RBC # FLD: 12.8 % — SIGNIFICANT CHANGE UP (ref 10.3–14.5)
RH IG SCN BLD-IMP: NEGATIVE — SIGNIFICANT CHANGE UP
SODIUM SERPL-SCNC: 137 MMOL/L — SIGNIFICANT CHANGE UP (ref 135–145)
WBC # BLD: 7.6 K/UL — SIGNIFICANT CHANGE UP (ref 3.8–10.5)
WBC # FLD AUTO: 7.6 K/UL — SIGNIFICANT CHANGE UP (ref 3.8–10.5)

## 2019-01-02 PROCEDURE — 80048 BASIC METABOLIC PNL TOTAL CA: CPT

## 2019-01-02 PROCEDURE — 85027 COMPLETE CBC AUTOMATED: CPT

## 2019-01-02 PROCEDURE — 93005 ELECTROCARDIOGRAM TRACING: CPT

## 2019-01-02 PROCEDURE — 93010 ELECTROCARDIOGRAM REPORT: CPT | Mod: 77

## 2019-01-02 PROCEDURE — 93306 TTE W/DOPPLER COMPLETE: CPT | Mod: 26

## 2019-01-02 PROCEDURE — 93010 ELECTROCARDIOGRAM REPORT: CPT

## 2019-01-02 PROCEDURE — 86901 BLOOD TYPING SEROLOGIC RH(D): CPT

## 2019-01-02 PROCEDURE — 93312 ECHO TRANSESOPHAGEAL: CPT | Mod: 26

## 2019-01-02 PROCEDURE — 85730 THROMBOPLASTIN TIME PARTIAL: CPT

## 2019-01-02 PROCEDURE — 93306 TTE W/DOPPLER COMPLETE: CPT

## 2019-01-02 PROCEDURE — 93312 ECHO TRANSESOPHAGEAL: CPT

## 2019-01-02 PROCEDURE — 85610 PROTHROMBIN TIME: CPT

## 2019-01-02 PROCEDURE — 86900 BLOOD TYPING SEROLOGIC ABO: CPT

## 2019-01-02 PROCEDURE — 86850 RBC ANTIBODY SCREEN: CPT

## 2019-01-02 RX ORDER — RANITIDINE HYDROCHLORIDE 150 MG/1
1 TABLET, FILM COATED ORAL
Qty: 0 | Refills: 0 | COMMUNITY

## 2019-01-02 NOTE — H&P CARDIOLOGY - HISTORY OF PRESENT ILLNESS
62 y/o  female  w/ PMH of Mitral valve prolapse, Glaucoma, Depression p/w palpitations recent visit to ER and found to be in Afib. 64 y/o  female  w/ PMH of Mitral valve prolapse in recurrent symptomatic atrophic relation , Glaucoma, Depression p/w palpitations recent visit to ER and found to be in Afib.  Patient had presented to the emergency room in October with a few week history of palpitations or racing heart. It was not associated with any presyncope or syncope nor did she have chest pain, shortness of breath or diaphoresis. This was new to her. She reported not eating or drinking the day prior to the episode. She was also recently started on Prozac. She was started on metoprolol XL 25 mg and given aspirin. A workup included a normal echocardiogram and she was discharged home.     On followup a Holter monitor demonstrated a 10% atrial fibrillation burden as well as some episodes of atrial tachycardia with rates in the high 100s. Her minimum heart rates were in the 50s. It was recommended that she start on anticoagulation.   Echocardiogram: Normal left ventricular size and function, mitral valve prolapse   Pt is scheduled for Afib Ablation on 1/3/19. 62 y/o  female  w/ PMH of Mitral valve prolapse in recurrent symptomatic atrophic relation , Glaucoma, Depression, Migraines p/w palpitations recent visit to ER and found to be in Afib.  Patient had presented to the emergency room in October with a few week history of palpitations or racing heart. It was not associated with any presyncope or syncope nor did she have chest pain, shortness of breath or diaphoresis. This was new to her. She reported not eating or drinking the day prior to the episode. She was also recently started on Prozac. She was started on metoprolol XL 25 mg and given aspirin. A workup included a normal echocardiogram and she was discharged home. Pt was started on Propanolol and pt reports not feeling well after one dose and stopped medication on her own.     On followup a Holter monitor demonstrated a 10% atrial fibrillation burden as well as some episodes of atrial tachycardia with rates in the high 100s. Her minimum heart rates were in the 50s. It was recommended that she start on anticoagulation.   Echocardiogram: Normal left ventricular size and function, mitral valve prolapse.    Pt is scheduled for Afib Ablation on 1/3/19. 64 y/o  female  w/ PMH of Mitral valve prolapse in recurrent symptomatic atrophic relation , Glaucoma, Depression, Migraines p/w palpitations recent visit to ER and found to be in Afib.  Patient had presented to the emergency room in October with a few week history of palpitations or racing heart. It was not associated with any presyncope or syncope nor did she have chest pain, shortness of breath or diaphoresis. This was new to her. She reported not eating or drinking the day prior to the episode. She was also recently started on Prozac. She was started on metoprolol XL 25 mg and given aspirin. A workup included a normal echocardiogram and she was discharged home. Pt was started on Propanolol 80 mg and pt reports not feeling well after one dose and stopped medication on her own.     On followup a Holter monitor demonstrated a 10% atrial fibrillation burden as well as some episodes of atrial tachycardia with rates in the high 100s. Her minimum heart rates were in the 50s. It was recommended that she start on anticoagulation.   Echocardiogram: Normal left ventricular size and function, mitral valve prolapse.    Pt is scheduled for Afib Ablation on 1/3/19. 62 y/o  female  w/ PMH of Mitral valve prolapse in recurrent symptomatic atrophic relation , Glaucoma, Depression, Migraines p/w palpitations recent visit to ER and found to be in Afib.  Patient had presented to the emergency room in October with a few week history of palpitations or racing heart. It was not associated with any presyncope or syncope nor did she have chest pain, shortness of breath or diaphoresis. This was new to her. She reported not eating or drinking the day prior to the episode. She was also recently started on Prozac. She was started on metoprolol XL 25 mg and given aspirin. A workup included a normal echocardiogram and she was discharged home. Pt was started on Propanolol 80 mg and pt reports not feeling well after one dose and stopped medication on her own. D/W Dr Benavides.     On followup a Holter monitor demonstrated a 10% atrial fibrillation burden as well as some episodes of atrial tachycardia with rates in the high 100s. Her minimum heart rates were in the 50s. It was recommended that she start on anticoagulation.   Echocardiogram: Normal left ventricular size and function, mitral valve prolapse.    Pt is scheduled for Afib Ablation on 1/3/19.

## 2019-01-03 ENCOUNTER — TRANSCRIPTION ENCOUNTER (OUTPATIENT)
Age: 64
End: 2019-01-03

## 2019-01-03 ENCOUNTER — OUTPATIENT (OUTPATIENT)
Dept: INPATIENT UNIT | Facility: HOSPITAL | Age: 64
LOS: 1 days | End: 2019-01-03
Payer: COMMERCIAL

## 2019-01-03 VITALS
HEART RATE: 70 BPM | OXYGEN SATURATION: 99 % | DIASTOLIC BLOOD PRESSURE: 65 MMHG | SYSTOLIC BLOOD PRESSURE: 112 MMHG | HEIGHT: 69 IN | WEIGHT: 160.5 LBS | RESPIRATION RATE: 24 BRPM | TEMPERATURE: 98 F

## 2019-01-03 DIAGNOSIS — Z01.818 ENCOUNTER FOR OTHER PREPROCEDURAL EXAMINATION: ICD-10-CM

## 2019-01-03 DIAGNOSIS — I25.10 ATHEROSCLEROTIC HEART DISEASE OF NATIVE CORONARY ARTERY WITHOUT ANGINA PECTORIS: ICD-10-CM

## 2019-01-03 DIAGNOSIS — I49.9 CARDIAC ARRHYTHMIA, UNSPECIFIED: ICD-10-CM

## 2019-01-03 LAB
ANION GAP SERPL CALC-SCNC: 10 MMOL/L — SIGNIFICANT CHANGE UP (ref 5–17)
BUN SERPL-MCNC: 14 MG/DL — SIGNIFICANT CHANGE UP (ref 7–23)
CALCIUM SERPL-MCNC: 8.6 MG/DL — SIGNIFICANT CHANGE UP (ref 8.4–10.5)
CHLORIDE SERPL-SCNC: 111 MMOL/L — HIGH (ref 96–108)
CO2 SERPL-SCNC: 19 MMOL/L — LOW (ref 22–31)
CREAT SERPL-MCNC: 0.91 MG/DL — SIGNIFICANT CHANGE UP (ref 0.5–1.3)
GLUCOSE SERPL-MCNC: 136 MG/DL — HIGH (ref 70–99)
HCT VFR BLD CALC: 36.7 % — SIGNIFICANT CHANGE UP (ref 34.5–45)
HGB BLD-MCNC: 12.7 G/DL — SIGNIFICANT CHANGE UP (ref 11.5–15.5)
MAGNESIUM SERPL-MCNC: 2.2 MG/DL — SIGNIFICANT CHANGE UP (ref 1.6–2.6)
MCHC RBC-ENTMCNC: 32.3 PG — SIGNIFICANT CHANGE UP (ref 27–34)
MCHC RBC-ENTMCNC: 34.6 GM/DL — SIGNIFICANT CHANGE UP (ref 32–36)
MCV RBC AUTO: 93.3 FL — SIGNIFICANT CHANGE UP (ref 80–100)
PHOSPHATE SERPL-MCNC: 2.6 MG/DL — SIGNIFICANT CHANGE UP (ref 2.5–4.5)
PLATELET # BLD AUTO: 308 K/UL — SIGNIFICANT CHANGE UP (ref 150–400)
POTASSIUM SERPL-MCNC: 3.7 MMOL/L — SIGNIFICANT CHANGE UP (ref 3.5–5.3)
POTASSIUM SERPL-SCNC: 3.7 MMOL/L — SIGNIFICANT CHANGE UP (ref 3.5–5.3)
RBC # BLD: 3.93 M/UL — SIGNIFICANT CHANGE UP (ref 3.8–5.2)
RBC # FLD: 12.9 % — SIGNIFICANT CHANGE UP (ref 10.3–14.5)
RH IG SCN BLD-IMP: NEGATIVE — SIGNIFICANT CHANGE UP
SODIUM SERPL-SCNC: 140 MMOL/L — SIGNIFICANT CHANGE UP (ref 135–145)
WBC # BLD: 13.6 K/UL — HIGH (ref 3.8–10.5)
WBC # FLD AUTO: 13.6 K/UL — HIGH (ref 3.8–10.5)

## 2019-01-03 PROCEDURE — C1893: CPT

## 2019-01-03 PROCEDURE — C1730: CPT

## 2019-01-03 PROCEDURE — C1759: CPT

## 2019-01-03 PROCEDURE — C1732: CPT

## 2019-01-03 PROCEDURE — 93662 INTRACARDIAC ECG (ICE): CPT

## 2019-01-03 PROCEDURE — 93656 COMPRE EP EVAL ABLTJ ATR FIB: CPT

## 2019-01-03 PROCEDURE — C1894: CPT

## 2019-01-03 PROCEDURE — 93623 PRGRMD STIMJ&PACG IV RX NFS: CPT

## 2019-01-03 RX ORDER — FLECAINIDE ACETATE 50 MG
100 TABLET ORAL EVERY 12 HOURS
Qty: 0 | Refills: 0 | Status: DISCONTINUED | OUTPATIENT
Start: 2019-01-03 | End: 2019-01-04

## 2019-01-03 RX ORDER — CLONAZEPAM 1 MG
0.25 TABLET ORAL DAILY
Qty: 0 | Refills: 0 | Status: DISCONTINUED | OUTPATIENT
Start: 2019-01-03 | End: 2019-01-04

## 2019-01-03 RX ORDER — PROPRANOLOL HCL 160 MG
60 CAPSULE, EXTENDED RELEASE 24HR ORAL DAILY
Qty: 0 | Refills: 0 | Status: DISCONTINUED | OUTPATIENT
Start: 2019-01-04 | End: 2019-01-04

## 2019-01-03 RX ORDER — LATANOPROST 0.05 MG/ML
1 SOLUTION/ DROPS OPHTHALMIC; TOPICAL AT BEDTIME
Qty: 0 | Refills: 0 | Status: DISCONTINUED | OUTPATIENT
Start: 2019-01-03 | End: 2019-01-04

## 2019-01-03 RX ORDER — RIVAROXABAN 15 MG-20MG
20 KIT ORAL EVERY 24 HOURS
Qty: 0 | Refills: 0 | Status: DISCONTINUED | OUTPATIENT
Start: 2019-01-03 | End: 2019-01-04

## 2019-01-03 RX ORDER — SIMETHICONE 80 MG/1
80 TABLET, CHEWABLE ORAL EVERY 6 HOURS
Qty: 0 | Refills: 0 | Status: DISCONTINUED | OUTPATIENT
Start: 2019-01-03 | End: 2019-01-04

## 2019-01-03 RX ORDER — PANTOPRAZOLE SODIUM 20 MG/1
40 TABLET, DELAYED RELEASE ORAL
Qty: 0 | Refills: 0 | Status: DISCONTINUED | OUTPATIENT
Start: 2019-01-03 | End: 2019-01-04

## 2019-01-03 RX ORDER — FLUOXETINE HCL 10 MG
20 CAPSULE ORAL DAILY
Qty: 0 | Refills: 0 | Status: DISCONTINUED | OUTPATIENT
Start: 2019-01-03 | End: 2019-01-04

## 2019-01-03 RX ORDER — SODIUM CHLORIDE 9 MG/ML
3 INJECTION INTRAMUSCULAR; INTRAVENOUS; SUBCUTANEOUS EVERY 8 HOURS
Qty: 0 | Refills: 0 | Status: DISCONTINUED | OUTPATIENT
Start: 2019-01-03 | End: 2019-01-04

## 2019-01-03 RX ORDER — PROPRANOLOL HCL 160 MG
120 CAPSULE, EXTENDED RELEASE 24HR ORAL DAILY
Qty: 0 | Refills: 0 | Status: DISCONTINUED | OUTPATIENT
Start: 2019-01-03 | End: 2019-01-03

## 2019-01-03 RX ORDER — SUMATRIPTAN SUCCINATE 4 MG/.5ML
50 INJECTION, SOLUTION SUBCUTANEOUS
Qty: 0 | Refills: 0 | Status: DISCONTINUED | OUTPATIENT
Start: 2019-01-03 | End: 2019-01-04

## 2019-01-03 RX ORDER — POTASSIUM CHLORIDE 20 MEQ
20 PACKET (EA) ORAL ONCE
Qty: 0 | Refills: 0 | Status: COMPLETED | OUTPATIENT
Start: 2019-01-03 | End: 2019-01-03

## 2019-01-03 RX ORDER — CLONAZEPAM 1 MG
0.5 TABLET ORAL DAILY
Qty: 0 | Refills: 0 | Status: DISCONTINUED | OUTPATIENT
Start: 2019-01-03 | End: 2019-01-03

## 2019-01-03 RX ORDER — APIXABAN 2.5 MG/1
5 TABLET, FILM COATED ORAL EVERY 12 HOURS
Qty: 0 | Refills: 0 | Status: DISCONTINUED | OUTPATIENT
Start: 2019-01-03 | End: 2019-01-03

## 2019-01-03 RX ORDER — SIMETHICONE 80 MG/1
80 TABLET, CHEWABLE ORAL EVERY 8 HOURS
Qty: 0 | Refills: 0 | Status: DISCONTINUED | OUTPATIENT
Start: 2019-01-03 | End: 2019-01-04

## 2019-01-03 RX ORDER — SODIUM CHLORIDE 9 MG/ML
500 INJECTION INTRAMUSCULAR; INTRAVENOUS; SUBCUTANEOUS ONCE
Qty: 0 | Refills: 0 | Status: COMPLETED | OUTPATIENT
Start: 2019-01-03 | End: 2019-01-03

## 2019-01-03 RX ADMIN — Medication 100 MILLIGRAM(S): at 17:03

## 2019-01-03 RX ADMIN — Medication 20 MILLIEQUIVALENT(S): at 17:51

## 2019-01-03 RX ADMIN — Medication 120 MILLIGRAM(S): at 14:30

## 2019-01-03 RX ADMIN — Medication 20 MILLIGRAM(S): at 14:30

## 2019-01-03 RX ADMIN — SIMETHICONE 80 MILLIGRAM(S): 80 TABLET, CHEWABLE ORAL at 21:46

## 2019-01-03 RX ADMIN — SODIUM CHLORIDE 500 MILLILITER(S): 9 INJECTION INTRAMUSCULAR; INTRAVENOUS; SUBCUTANEOUS at 19:51

## 2019-01-03 RX ADMIN — SODIUM CHLORIDE 3 MILLILITER(S): 9 INJECTION INTRAMUSCULAR; INTRAVENOUS; SUBCUTANEOUS at 21:45

## 2019-01-03 RX ADMIN — PANTOPRAZOLE SODIUM 40 MILLIGRAM(S): 20 TABLET, DELAYED RELEASE ORAL at 14:30

## 2019-01-03 RX ADMIN — SODIUM CHLORIDE 3 MILLILITER(S): 9 INJECTION INTRAMUSCULAR; INTRAVENOUS; SUBCUTANEOUS at 14:23

## 2019-01-03 RX ADMIN — SODIUM CHLORIDE 500 MILLILITER(S): 9 INJECTION INTRAMUSCULAR; INTRAVENOUS; SUBCUTANEOUS at 20:15

## 2019-01-03 RX ADMIN — SIMETHICONE 80 MILLIGRAM(S): 80 TABLET, CHEWABLE ORAL at 14:45

## 2019-01-03 RX ADMIN — RIVAROXABAN 20 MILLIGRAM(S): KIT at 16:34

## 2019-01-03 NOTE — PROGRESS NOTE ADULT - SUBJECTIVE AND OBJECTIVE BOX
====================  CCU MIDNIGHT ROUNDS  ====================    EFRA GUTIERREZ  [id number]    Patient is a 63y old  Female who presents for an elective Afib ablation for PAF    ====================  SUMMARY: 63F with PMH of Mitral valve prolapse in recurrent symptomatic atrophic relation , Glaucoma, Depression, Migraines history of palpitations or racing heart. Holter monitor demonstrated a 10% atrial fibrillation burden as well as some episodes of atrial tachycardia with rates in the high 100s. 1/3 s/p PAF Ablation.  ====================    ====================  NEW EVENTS: Groin sutures removed at 6pm.  ~8pm episode of symptomatic hypotension (SBP 70s) in NSR 70s s/p stat TTE revealing trace pericardial effusion (prelim).  Patient given 1L IVF bolus with improvement in BP.  Patient received Propranolol LA 120mg at 2pm.  Start on Flecainide today.  Discussed above with Dr. Benavides who will see patient in AM.  Of note, I will order Propranolol LA 60mg for 12pm tomorrow which may be changed by Dr. Benavides in the morning.  ====================    MEDICATIONS  (STANDING):  flecainide 100 milliGRAM(s) Oral every 12 hours  FLUoxetine 20 milliGRAM(s) Oral daily  latanoprost 0.005% Ophthalmic Solution 1 Drop(s) Both EYES at bedtime  pantoprazole    Tablet 40 milliGRAM(s) Oral before breakfast  rivaroxaban 20 milliGRAM(s) Oral every 24 hours  sodium chloride 0.9% lock flush 3 milliLiter(s) IV Push every 8 hours    MEDICATIONS  (PRN):  clonazePAM Tablet 0.25 milliGRAM(s) Oral daily PRN anxiety, panic disorder  simethicone 80 milliGRAM(s) Chew every 6 hours PRN Indigestion, Gas  SUMAtriptan 50 milliGRAM(s) Oral after breakfast PRN Migraine    ====================  VITALS (Last 12 hrs):  ====================  T(C): 36.9 (01-03-19 @ 19:00), Max: 36.9 (01-03-19 @ 19:00)  T(F): 98.4 (01-03-19 @ 19:00), Max: 98.4 (01-03-19 @ 19:00)  HR: 74 (01-03-19 @ 21:00) (64 - 90)  BP: 98/43 (01-03-19 @ 21:00) (65/50 - 139/72)  BP(mean): 57 (01-03-19 @ 21:00) (52 - 93)  RR: 18 (01-03-19 @ 21:00) (13 - 28)  SpO2: 98% (01-03-19 @ 21:00) (97% - 100%)      I&O's Summary    03 Jan 2019 07:01  -  03 Jan 2019 21:17  --------------------------------------------------------  IN: 1400 mL / OUT: 900 mL / NET: 500 mL    ====================  NEW LABS:  ====================  01-03    140  |  111<H>  |  14  ----------------------------<  136<H>  3.7   |  19<L>  |  0.91    Ca    8.6      03 Jan 2019 14:14  Phos  2.6     01-03  Mg     2.2     01-03    PT/INR - ( 02 Jan 2019 08:38 )   PT: 11.4 sec;   INR: 0.99 ratio      PTT - ( 02 Jan 2019 08:38 )  PTT:33.0 sec  ====================  PLAN:  ====================  #PAF s/p ablation   - Resume Xarelto 20mg PO daily / Groin sites stable  - Decreased Propranolol LA to 60mg PO to start tmrw 12pm  - Continue Flecainide 100mg PO bid  - PPI daily     Romy Castaneda CCU NP  Beeper #[id number]  Spectra # [id number] ====================  CCU MIDNIGHT ROUNDS  ====================    EFRA GUTIERREZ  [id number]    Patient is a 63y old  Female who presents for an elective Afib ablation for PAF    ====================  SUMMARY: 63F with PMH of Mitral valve prolapse in recurrent symptomatic atrophic relation , Glaucoma, Depression, Migraines history of palpitations or racing heart. Holter monitor demonstrated a 10% atrial fibrillation burden as well as some episodes of atrial tachycardia with rates in the high 100s. 1/3 s/p PAF Ablation.  ====================    ====================  NEW EVENTS: Groin sutures removed at 6pm, sites remain soft no hematoma.  ~8pm episode of symptomatic hypotension (SBP 70s) in NSR 70s s/p stat TTE revealing trace pericardial effusion (prelim).  Patient given 1L IVF bolus with improvement in BP.  Patient received Propranolol LA 120mg at 2pm.  Start on Flecainide today.  Discussed above with Dr. Benavides who will see patient in AM.  Of note, I will order Propranolol LA 60mg for 12pm tomorrow which may be changed by Dr. Benavides in the morning.  ====================    MEDICATIONS  (STANDING):  flecainide 100 milliGRAM(s) Oral every 12 hours  FLUoxetine 20 milliGRAM(s) Oral daily  latanoprost 0.005% Ophthalmic Solution 1 Drop(s) Both EYES at bedtime  pantoprazole    Tablet 40 milliGRAM(s) Oral before breakfast  rivaroxaban 20 milliGRAM(s) Oral every 24 hours  sodium chloride 0.9% lock flush 3 milliLiter(s) IV Push every 8 hours    MEDICATIONS  (PRN):  clonazePAM Tablet 0.25 milliGRAM(s) Oral daily PRN anxiety, panic disorder  simethicone 80 milliGRAM(s) Chew every 6 hours PRN Indigestion, Gas  SUMAtriptan 50 milliGRAM(s) Oral after breakfast PRN Migraine    ====================  VITALS (Last 12 hrs):  ====================  T(C): 36.9 (01-03-19 @ 19:00), Max: 36.9 (01-03-19 @ 19:00)  T(F): 98.4 (01-03-19 @ 19:00), Max: 98.4 (01-03-19 @ 19:00)  HR: 74 (01-03-19 @ 21:00) (64 - 90)  BP: 98/43 (01-03-19 @ 21:00) (65/50 - 139/72)  BP(mean): 57 (01-03-19 @ 21:00) (52 - 93)  RR: 18 (01-03-19 @ 21:00) (13 - 28)  SpO2: 98% (01-03-19 @ 21:00) (97% - 100%)      I&O's Summary    03 Jan 2019 07:01  -  03 Jan 2019 21:17  --------------------------------------------------------  IN: 1400 mL / OUT: 900 mL / NET: 500 mL    ====================  NEW LABS:  ====================  01-03    140  |  111<H>  |  14  ----------------------------<  136<H>  3.7   |  19<L>  |  0.91    Ca    8.6      03 Jan 2019 14:14  Phos  2.6     01-03  Mg     2.2     01-03    PT/INR - ( 02 Jan 2019 08:38 )   PT: 11.4 sec;   INR: 0.99 ratio      PTT - ( 02 Jan 2019 08:38 )  PTT:33.0 sec  ====================  PLAN:  ====================  #PAF s/p ablation   - Resume Xarelto 20mg PO daily / Groin sites stable  - Decreased Propranolol LA to 60mg daily (12pm tmrw)  - F/U with Dr. Benavides in the morning regarding BB dose   - Continue Flecainide 100mg PO bid  - Continue PPI daily   - Mechanical soft diet  - DC planning      Romy Castaneda CCU NP  Beeper #[id number]  Spectra # [id number] ====================  CCU MIDNIGHT ROUNDS  ====================    EFRA GUTIERREZ  [id number]    Patient is a 63y old  Female who presents for an elective Afib ablation for PAF    ====================  SUMMARY: 63F with PMH of Mitral valve prolapse in recurrent symptomatic atrophic relation , Glaucoma, Depression, Migraines history of palpitations or racing heart. Holter monitor demonstrated a 10% atrial fibrillation burden as well as some episodes of atrial tachycardia with rates in the high 100s. 1/3 s/p PAF Ablation.  ====================    ====================  NEW EVENTS: Groin sutures removed at 6pm, sites remain soft no hematoma.  ~8pm episode of symptomatic hypotension (SBP 70s) in NSR 70s s/p stat TTE revealing trace pericardial effusion (prelim).  Patient given 1L IVF bolus with improvement in BP.  Patient received Propranolol LA 120mg at 2pm.  Was started on Flecainide today.  Discussed above with Dr. Benavides who will see patient in AM to change dose of beta blocker.  Of note, I will order Propranolol LA 60mg daily for tomorrow afternoon which may be changed by Dr. Benavides in the morning.  ====================    MEDICATIONS  (STANDING):  flecainide 100 milliGRAM(s) Oral every 12 hours  FLUoxetine 20 milliGRAM(s) Oral daily  latanoprost 0.005% Ophthalmic Solution 1 Drop(s) Both EYES at bedtime  pantoprazole    Tablet 40 milliGRAM(s) Oral before breakfast  rivaroxaban 20 milliGRAM(s) Oral every 24 hours  sodium chloride 0.9% lock flush 3 milliLiter(s) IV Push every 8 hours    MEDICATIONS  (PRN):  clonazePAM Tablet 0.25 milliGRAM(s) Oral daily PRN anxiety, panic disorder  simethicone 80 milliGRAM(s) Chew every 6 hours PRN Indigestion, Gas  SUMAtriptan 50 milliGRAM(s) Oral after breakfast PRN Migraine    ====================  VITALS (Last 12 hrs):  ====================  T(C): 36.9 (01-03-19 @ 19:00), Max: 36.9 (01-03-19 @ 19:00)  T(F): 98.4 (01-03-19 @ 19:00), Max: 98.4 (01-03-19 @ 19:00)  HR: 74 (01-03-19 @ 21:00) (64 - 90)  BP: 98/43 (01-03-19 @ 21:00) (65/50 - 139/72)  BP(mean): 57 (01-03-19 @ 21:00) (52 - 93)  RR: 18 (01-03-19 @ 21:00) (13 - 28)  SpO2: 98% (01-03-19 @ 21:00) (97% - 100%)      I&O's Summary    03 Jan 2019 07:01  -  03 Jan 2019 21:17  --------------------------------------------------------  IN: 1400 mL / OUT: 900 mL / NET: 500 mL    ====================  NEW LABS:  ====================  01-03    140  |  111<H>  |  14  ----------------------------<  136<H>  3.7   |  19<L>  |  0.91    Ca    8.6      03 Jan 2019 14:14  Phos  2.6     01-03  Mg     2.2     01-03    PT/INR - ( 02 Jan 2019 08:38 )   PT: 11.4 sec;   INR: 0.99 ratio      PTT - ( 02 Jan 2019 08:38 )  PTT:33.0 sec  ====================  PLAN:  ====================  #PAF s/p ablation   - Resume Xarelto 20mg PO daily / Groin sites stable  - Decreased Propranolol LA to 60mg daily (12pm tmrw)  - F/U with Dr. Benavides in the morning regarding BB dose   - Continue Flecainide 100mg PO bid  - Continue PPI daily   - Mechanical soft diet  - DC planning      Romy Castaneda CCU NP  Beeper #[id number]  Spectra # [id number]

## 2019-01-03 NOTE — CHART NOTE - NSCHARTNOTEFT_GEN_A_CORE
BRIEF PROCEDURE NOTE    EFRA GUTIERREZ  [id number]    Pre-op Diagnosis: PAF    Post-op Diagnosis: same    Procedure: EPS/Abl    Electrophysiologist: Mis Benavides MD    Assistant: Gary Ingram    Anesthesia: GA    Access: R/L FV    Description:  8FR LFV: decpolar CS  8Fr RFV: ICE  8Fr RFV: TS, Mapping/Abl    ICE guided TS  3D shell created  patient in and out of AF  PVI  Abaltion along Mitral annulus  ABl along mitral annulus  Abl in CS to Os  CTI    Ibutilide given  PVI confirmed with pacing  CTI bidirectional block confirmed with differential pacing    EPS:  normal antegrade and retrograde conduction  No AP  normal intervals    Complications: none    EBL: 20cc    Disposition: to PICU stable    Plan:  Apixiban 5mg BID at 5PM  remove stitch 7PM  BBlocker  Flecainide 100 BID  Omeprazole

## 2019-01-03 NOTE — CHART NOTE - NSCHARTNOTEFT_GEN_A_CORE
CCU Accept Note    Transfer from: ( x  ) _____EP LAB_____    Accepting physican: Jostin Sanchez MD    HPI:    62 y/o  female  w/ PMH of Mitral valve prolapse in recurrent symptomatic atrophic relation , Glaucoma, Depression, Migraines p/w palpitations recent visit to ER and found to be in Afib.  Patient had presented to the emergency room in October with a few week history of palpitations or racing heart. It was not associated with any presyncope or syncope nor did she have chest pain, shortness of breath or diaphoresis. This was new to her. She reported not eating or drinking the day prior to the episode. She was also recently started on Prozac. She was started on metoprolol XL 25 mg and given aspirin. A workup included a normal echocardiogram and she was discharged home. Pt was started on Propanolol 80 mg and pt reports not feeling well after one dose and stopped medication on her own.    On followup a Holter monitor demonstrated a 10% atrial fibrillation burden as well as some episodes of atrial tachycardia with rates in the high 100s. Her minimum heart rates were in the 50s. It was recommended that she start on anticoagulation.     REVIEW OF SYSTEMS:    CONSTITUTIONAL: No weakness, fevers or chills  EYES/ENT: No visual changes;  No vertigo or throat pain   NECK: No pain or stiffness  RESPIRATORY: No cough, wheezing, hemoptysis; No shortness of breath  CARDIOVASCULAR: No chest pain or palpitations  GASTROINTESTINAL: No abdominal or epigastric pain. No nausea, vomiting, or hematemesis; No diarrhea or constipation. No melena or hematochezia.  GENITOURINARY: No dysuria, frequency or hematuria  NEUROLOGICAL: No numbness or weakness  SKIN: No itching, rashes      Vital Signs Last 24 Hrs  T(C): 36.7 (03 Jan 2019 12:15), Max: 36.7 (03 Jan 2019 12:15)  T(F): 98.1 (03 Jan 2019 12:15), Max: 98.1 (03 Jan 2019 12:15)  HR: 80 (03 Jan 2019 12:45) (68 - 80)  BP: 128/72 (03 Jan 2019 12:45) (111/73 - 128/72)  BP(mean): 89 (03 Jan 2019 12:45) (77 - 89)  RR: 23 (03 Jan 2019 12:45) (13 - 24)  SpO2: 100% (03 Jan 2019 12:45) (99% - 100%)  I&O's Summary      Allergies:      MEDICATIONS  (STANDING):  apixaban 5 milliGRAM(s) Oral every 12 hours  flecainide 100 milliGRAM(s) Oral every 12 hours  FLUoxetine 20 milliGRAM(s) Oral daily  latanoprost 0.005% Ophthalmic Solution 1 Drop(s) Both EYES at bedtime  pantoprazole    Tablet 40 milliGRAM(s) Oral before breakfast  propranolol  milliGRAM(s) Oral daily  sodium chloride 0.9% lock flush 3 milliLiter(s) IV Push every 8 hours    MEDICATIONS  (PRN):  SUMAtriptan 50 milliGRAM(s) Oral after breakfast PRN Migraine      LABS      EKG:  Telemetry:  Echo:  Cardiac Cath:  Stress Test:  Imaging    General: WN/WD NAD  Neurology: Awake, nonfocal, AQUINO x 4  Eyes: Scleras clear, PERRLA/ EOMI, Gross vision intact  ENT:Gross hearing intact, grossly patent pharynx, no stridor  Neck: Neck supple, trachea midline, No JVD,   Respiratory: CTA B/L, No wheezing, rales, rhonchi  CV: RRR, S1S2, no murmurs, rubs or gallops  Abdominal: Soft, NT, ND +BS,   Extremities: No edema, + peripheral pulses  Skin: No Rashes, Hematoma, Ecchymosis  Lymphatic: No Neck, axilla, groin LAD  Psych: Oriented x 3, normal affect  Incisions:         ASSESSMENT & PLAN: CCU Accept Note    Transfer from: ( x  ) _____EP LAB_____    Accepting physican: Jostin Sanchez MD    HPI:    62 y/o  female  w/ PMH of Mitral valve prolapse in recurrent symptomatic atrophic relation , Glaucoma, Depression, Migraines p/w palpitations recent visit to ER and found to be in Afib.  Patient had presented to the emergency room in October with a few week history of palpitations or racing heart. It was not associated with any presyncope or syncope nor did she have chest pain, shortness of breath or diaphoresis. This was new to her. She reported not eating or drinking the day prior to the episode. She was also recently started on Prozac. She was started on metoprolol XL 25 mg and given aspirin. A workup included a normal echocardiogram and she was discharged home. Pt was started on Propanolol 80 mg and pt reports not feeling well after one dose and stopped medication on her own.    On followup a Holter monitor demonstrated a 10% atrial fibrillation burden as well as some episodes of atrial tachycardia with rates in the high 100s. Her minimum heart rates were in the 50s. It was recommended that she start on anticoagulation.     REVIEW OF SYSTEMS:    CONSTITUTIONAL: No weakness, fevers or chills  EYES/ENT: No visual changes;  No vertigo or throat pain   NECK: No pain or stiffness  RESPIRATORY: No cough, wheezing, hemoptysis; No shortness of breath  CARDIOVASCULAR: No chest pain or palpitations  GASTROINTESTINAL: No abdominal or epigastric pain. No nausea, vomiting, or hematemesis; No diarrhea or constipation. No melena or hematochezia.  GENITOURINARY: No dysuria, frequency or hematuria  NEUROLOGICAL: No numbness or weakness  SKIN: No itching, rashes      Vital Signs Last 24 Hrs  T(C): 36.7 (03 Jan 2019 12:15), Max: 36.7 (03 Jan 2019 12:15)  T(F): 98.1 (03 Jan 2019 12:15), Max: 98.1 (03 Jan 2019 12:15)  HR: 80 (03 Jan 2019 12:45) (68 - 80)  BP: 128/72 (03 Jan 2019 12:45) (111/73 - 128/72)  BP(mean): 89 (03 Jan 2019 12:45) (77 - 89)  RR: 23 (03 Jan 2019 12:45) (13 - 24)  SpO2: 100% (03 Jan 2019 12:45) (99% - 100%)  I&O's Summary      Allergies:      MEDICATIONS  (STANDING):  apixaban 5 milliGRAM(s) Oral every 12 hours  flecainide 100 milliGRAM(s) Oral every 12 hours  FLUoxetine 20 milliGRAM(s) Oral daily  latanoprost 0.005% Ophthalmic Solution 1 Drop(s) Both EYES at bedtime  pantoprazole    Tablet 40 milliGRAM(s) Oral before breakfast  propranolol  milliGRAM(s) Oral daily  sodium chloride 0.9% lock flush 3 milliLiter(s) IV Push every 8 hours    MEDICATIONS  (PRN):  SUMAtriptan 50 milliGRAM(s) Oral after breakfast PRN Migraine      LABS      EKG:  Telemetry:  Echo:  Cardiac Cath:  Stress Test:  Imaging    General: WN/WD NAD  Neurology: Awake, nonfocal, AQUINO x 4  Eyes: Scleras clear, PERRLA/ EOMI, Gross vision intact  ENT:Gross hearing intact, grossly patent pharynx, no stridor  Neck: Neck supple, trachea midline, No JVD,   Respiratory: CTA B/L, No wheezing, rales, rhonchi  CV: RRR, S1S2, no murmurs, rubs or gallops  Abdominal: Soft, NT, ND +BS,   Extremities: No edema, + peripheral pulses  Skin: No Rashes, Hematoma, Ecchymosis  Lymphatic: No Neck, axilla, groin LAD  Psych: Oriented x 3, normal affect  Incisions: right groin c/d/i      ASSESSMENT & PLAN:    62 y/o  female  w/ PMH of Mitral valve prolapse in recurrent symptomatic atrophic relation , Glaucoma, Depression, Migraines history of palpitations or racing heart. Holter monitor demonstrated a 10% atrial fibrillation burden as well as some episodes of atrial tachycardia with rates in the high 100s. 1/3 s/p AFib Ablation. CCU Accept Note    Transfer from: ( x  ) _____EP LAB_____    Accepting physican: Jostin Sanchez MD    HPI:    62 y/o  female  w/ PMH of Mitral valve prolapse in recurrent symptomatic atrophic relation , Glaucoma, Depression, Migraines p/w palpitations recent visit to ER and found to be in Afib.  Patient had presented to the emergency room in October with a few week history of palpitations or racing heart. It was not associated with any presyncope or syncope nor did she have chest pain, shortness of breath or diaphoresis. This was new to her. She reported not eating or drinking the day prior to the episode. She was also recently started on Prozac. She was started on metoprolol XL 25 mg and given aspirin. A workup included a normal echocardiogram and she was discharged home. Pt was started on Propanolol 80 mg and pt reports not feeling well after one dose and stopped medication on her own.    On followup a Holter monitor demonstrated a 10% atrial fibrillation burden as well as some episodes of atrial tachycardia with rates in the high 100s. Her minimum heart rates were in the 50s. It was recommended that she start on anticoagulation.     REVIEW OF SYSTEMS:    CONSTITUTIONAL: No weakness, fevers or chills  EYES/ENT: No visual changes;  No vertigo or throat pain   NECK: No pain or stiffness  RESPIRATORY: No cough, wheezing, hemoptysis; No shortness of breath  CARDIOVASCULAR: No chest pain or palpitations  GASTROINTESTINAL: No abdominal or epigastric pain. No nausea, vomiting, or hematemesis; No diarrhea or constipation. No melena or hematochezia.  GENITOURINARY: No dysuria, frequency or hematuria  NEUROLOGICAL: No numbness or weakness  SKIN: No itching, rashes      Vital Signs Last 24 Hrs  T(C): 36.7 (03 Jan 2019 12:15), Max: 36.7 (03 Jan 2019 12:15)  T(F): 98.1 (03 Jan 2019 12:15), Max: 98.1 (03 Jan 2019 12:15)  HR: 80 (03 Jan 2019 12:45) (68 - 80)  BP: 128/72 (03 Jan 2019 12:45) (111/73 - 128/72)  BP(mean): 89 (03 Jan 2019 12:45) (77 - 89)  RR: 23 (03 Jan 2019 12:45) (13 - 24)  SpO2: 100% (03 Jan 2019 12:45) (99% - 100%)  I&O's Summary      Allergies:      MEDICATIONS  (STANDING):  apixaban 5 milliGRAM(s) Oral every 12 hours  flecainide 100 milliGRAM(s) Oral every 12 hours  FLUoxetine 20 milliGRAM(s) Oral daily  latanoprost 0.005% Ophthalmic Solution 1 Drop(s) Both EYES at bedtime  pantoprazole    Tablet 40 milliGRAM(s) Oral before breakfast  propranolol  milliGRAM(s) Oral daily  sodium chloride 0.9% lock flush 3 milliLiter(s) IV Push every 8 hours    MEDICATIONS  (PRN):  SUMAtriptan 50 milliGRAM(s) Oral after breakfast PRN Migraine      LABS      EKG:  < from: 12 Lead ECG (01.02.19 @ 08:22) >    Ventricular Rate 59 BPM    Atrial Rate 59 BPM    P-R Interval 142 ms    QRS Duration 100 ms    Q-T Interval 460 ms    QTC Calculation(Bezet) 455 ms    P Axis 70 degrees    R Axis 42 degrees    T Axis 57 degrees    Diagnosis Line SINUS BRADYCARDIA  LOW VOLTAGE QRS  BORDERLINE ECG      Telemetry: 78 bpm, SR with PVCs  Echo:  < from: Transesophageal Echocardiogram (01.02.19 @ 08:28) >    Conclusions:  1. Normal mitral valve. Minimal mitral regurgitation.  2. Normal trileaflet aortic valve. No aortic valve  regurgitation seen.  3. Normal left atrium.  LA volume index = 20 cc/m2. No left  atrial or left atrial appendage thrombus. Normal left  atrial appendage function (velocity>80 cm/s).  4. Normal left ventricular systolic function. No segmental  wall motion abnormalities.  5. Normal diastolic function.  6. Normal right ventricular size and function.  7. Agitated saline injection and color flow doppler  demonstrate no evidence of a patent foramen ovale.      Cardiac Cath:  Stress Test:  Imaging    General: WN/WD NAD  Neurology: Awake, nonfocal, AQUINO x 4  Eyes: Scleras clear, PERRLA/ EOMI, Gross vision intact  ENT:Gross hearing intact, grossly patent pharynx, no stridor  Neck: Neck supple, trachea midline, No JVD,   Respiratory: CTA B/L, No wheezing, rales, rhonchi  CV: RRR, S1S2, no murmurs, rubs or gallops  Abdominal: Soft, NT, ND +BS,   Extremities: No edema, + peripheral pulses  Skin: No Rashes, Hematoma, Ecchymosis  Lymphatic: No Neck, axilla, groin LAD  Psych: Oriented x 3, normal affect  Incisions: right groin c/d/i      ASSESSMENT & PLAN:    62 y/o  female  w/ PMH of Mitral valve prolapse in recurrent symptomatic atrophic relation , Glaucoma, Depression, Migraines history of palpitations or racing heart. Holter monitor demonstrated a 10% atrial fibrillation burden as well as some episodes of atrial tachycardia with rates in the high 100s. 1/3 s/p AFib Ablation.    - admit to PICU  - groin checks; remove stitches @ 1900  - Apixiban 5mg BID at 5PM; however as d/w patient and attending will switch to Xarelto 20mg d/t c/o stomach ache  - start Flecainide 100mg bid  Inderal LA 120mg   - start Protonix for 1 month  - mechanical soft diet for 1 month  - restart all other home meds  - follow up appointment with Dr. KIA Benavides at set date 2/25/19 @ 2751

## 2019-01-04 VITALS
DIASTOLIC BLOOD PRESSURE: 89 MMHG | HEART RATE: 71 BPM | OXYGEN SATURATION: 98 % | SYSTOLIC BLOOD PRESSURE: 105 MMHG | RESPIRATION RATE: 18 BRPM

## 2019-01-04 DIAGNOSIS — I48.0 PAROXYSMAL ATRIAL FIBRILLATION: ICD-10-CM

## 2019-01-04 LAB
ANION GAP SERPL CALC-SCNC: 11 MMOL/L — SIGNIFICANT CHANGE UP (ref 5–17)
BUN SERPL-MCNC: 12 MG/DL — SIGNIFICANT CHANGE UP (ref 7–23)
CALCIUM SERPL-MCNC: 8.1 MG/DL — LOW (ref 8.4–10.5)
CHLORIDE SERPL-SCNC: 108 MMOL/L — SIGNIFICANT CHANGE UP (ref 96–108)
CO2 SERPL-SCNC: 19 MMOL/L — LOW (ref 22–31)
CREAT SERPL-MCNC: 0.84 MG/DL — SIGNIFICANT CHANGE UP (ref 0.5–1.3)
GLUCOSE SERPL-MCNC: 116 MG/DL — HIGH (ref 70–99)
HCT VFR BLD CALC: 32.3 % — LOW (ref 34.5–45)
HGB BLD-MCNC: 11.2 G/DL — LOW (ref 11.5–15.5)
MAGNESIUM SERPL-MCNC: 2.1 MG/DL — SIGNIFICANT CHANGE UP (ref 1.6–2.6)
MCHC RBC-ENTMCNC: 32.4 PG — SIGNIFICANT CHANGE UP (ref 27–34)
MCHC RBC-ENTMCNC: 34.6 GM/DL — SIGNIFICANT CHANGE UP (ref 32–36)
MCV RBC AUTO: 93.8 FL — SIGNIFICANT CHANGE UP (ref 80–100)
PHOSPHATE SERPL-MCNC: 2.8 MG/DL — SIGNIFICANT CHANGE UP (ref 2.5–4.5)
PLATELET # BLD AUTO: 297 K/UL — SIGNIFICANT CHANGE UP (ref 150–400)
POTASSIUM SERPL-MCNC: 4 MMOL/L — SIGNIFICANT CHANGE UP (ref 3.5–5.3)
POTASSIUM SERPL-SCNC: 4 MMOL/L — SIGNIFICANT CHANGE UP (ref 3.5–5.3)
RBC # BLD: 3.45 M/UL — LOW (ref 3.8–5.2)
RBC # FLD: 13 % — SIGNIFICANT CHANGE UP (ref 10.3–14.5)
SODIUM SERPL-SCNC: 138 MMOL/L — SIGNIFICANT CHANGE UP (ref 135–145)
WBC # BLD: 11.2 K/UL — HIGH (ref 3.8–10.5)
WBC # FLD AUTO: 11.2 K/UL — HIGH (ref 3.8–10.5)

## 2019-01-04 RX ORDER — PROPRANOLOL HCL 160 MG
1 CAPSULE, EXTENDED RELEASE 24HR ORAL
Qty: 30 | Refills: 3
Start: 2019-01-04 | End: 2019-05-03

## 2019-01-04 RX ORDER — PANTOPRAZOLE SODIUM 20 MG/1
40 TABLET, DELAYED RELEASE ORAL ONCE
Qty: 0 | Refills: 0 | Status: COMPLETED | OUTPATIENT
Start: 2019-01-04 | End: 2019-01-04

## 2019-01-04 RX ORDER — ACETAMINOPHEN 500 MG
1000 TABLET ORAL ONCE
Qty: 0 | Refills: 0 | Status: COMPLETED | OUTPATIENT
Start: 2019-01-04 | End: 2019-01-04

## 2019-01-04 RX ORDER — BENZOCAINE AND MENTHOL 5; 1 G/100ML; G/100ML
1 LIQUID ORAL
Qty: 0 | Refills: 0 | Status: DISCONTINUED | OUTPATIENT
Start: 2019-01-04 | End: 2019-01-04

## 2019-01-04 RX ORDER — ALPRAZOLAM 0.25 MG
0.5 TABLET ORAL ONCE
Qty: 0 | Refills: 0 | Status: DISCONTINUED | OUTPATIENT
Start: 2019-01-04 | End: 2019-01-04

## 2019-01-04 RX ORDER — FLECAINIDE ACETATE 50 MG
1 TABLET ORAL
Qty: 60 | Refills: 3 | OUTPATIENT
Start: 2019-01-04 | End: 2019-05-03

## 2019-01-04 RX ORDER — SODIUM CHLORIDE 9 MG/ML
500 INJECTION INTRAMUSCULAR; INTRAVENOUS; SUBCUTANEOUS ONCE
Qty: 0 | Refills: 0 | Status: COMPLETED | OUTPATIENT
Start: 2019-01-03 | End: 2019-01-03

## 2019-01-04 RX ORDER — PANTOPRAZOLE SODIUM 20 MG/1
1 TABLET, DELAYED RELEASE ORAL
Qty: 30 | Refills: 0
Start: 2019-01-04 | End: 2019-02-02

## 2019-01-04 RX ORDER — APIXABAN 2.5 MG/1
1 TABLET, FILM COATED ORAL
Qty: 0 | Refills: 0 | COMMUNITY

## 2019-01-04 RX ORDER — RIVAROXABAN 15 MG-20MG
1 KIT ORAL
Qty: 30 | Refills: 3 | OUTPATIENT
Start: 2019-01-04 | End: 2019-05-03

## 2019-01-04 RX ADMIN — Medication 1000 MILLIGRAM(S): at 03:11

## 2019-01-04 RX ADMIN — Medication 400 MILLIGRAM(S): at 02:41

## 2019-01-04 RX ADMIN — BENZOCAINE AND MENTHOL 1 LOZENGE: 5; 1 LIQUID ORAL at 02:42

## 2019-01-04 RX ADMIN — Medication 0.5 MILLIGRAM(S): at 02:42

## 2019-01-04 RX ADMIN — Medication 100 MILLIGRAM(S): at 06:08

## 2019-01-04 RX ADMIN — SODIUM CHLORIDE 3 MILLILITER(S): 9 INJECTION INTRAMUSCULAR; INTRAVENOUS; SUBCUTANEOUS at 06:10

## 2019-01-04 RX ADMIN — PANTOPRAZOLE SODIUM 40 MILLIGRAM(S): 20 TABLET, DELAYED RELEASE ORAL at 02:42

## 2019-01-04 NOTE — PROGRESS NOTE ADULT - SUBJECTIVE AND OBJECTIVE BOX
24H hour events:   Seen ambulating at bedside; verbalized did not sleep well last night    MEDICATIONS:  flecainide 100 milliGRAM(s) Oral every 12 hours  propranolol LA 60 milliGRAM(s) Oral daily  rivaroxaban 20 milliGRAM(s) Oral every 24 hours  clonazePAM Tablet 0.25 milliGRAM(s) Oral daily PRN  FLUoxetine 20 milliGRAM(s) Oral daily  SUMAtriptan 50 milliGRAM(s) Oral after breakfast PRN  pantoprazole    Tablet 40 milliGRAM(s) Oral before breakfast  simethicone 80 milliGRAM(s) Chew every 8 hours PRN  simethicone 80 milliGRAM(s) Chew every 6 hours PRN  benzocaine 15 mG/menthol 3.6 mG Lozenge 1 Lozenge Oral every 2 hours PRN  latanoprost 0.005% Ophthalmic Solution 1 Drop(s) Both EYES at bedtime  sodium chloride 0.9% lock flush 3 milliLiter(s) IV Push every 8 hours      REVIEW OF SYSTEMS:  Constitutional: no fever or chills  Neuro: no dizziness or lightheadedness or vision change  Respiratory: no sob or cough or wheezing; some sore throat  Cardiac: no chest pain or palpitations  GI: no N/V or abdominal pain  : no dysuria or hematuria  Ext: no tingling or numbness    PHYSICAL EXAM:  T(C): 36.9 (01-04-19 @ 08:00), Max: 37 (01-04-19 @ 06:00)  HR: 71 (01-04-19 @ 10:00) (64 - 90)  BP: 105/89 (01-04-19 @ 10:00) (65/50 - 139/72)  RR: 18 (01-04-19 @ 10:00) (13 - 28)  SpO2: 98% (01-04-19 @ 10:00) (96% - 100%)  Wt(kg): --  I&O's Summary    03 Jan 2019 07:01  -  04 Jan 2019 07:00  --------------------------------------------------------  IN: 2075 mL / OUT: 900 mL / NET: 1175 mL    04 Jan 2019 07:01  -  04 Jan 2019 10:08  --------------------------------------------------------  IN: 250 mL / OUT: 0 mL / NET: 250 mL        Appearance: Normal, in NAD  HEENT: Normocephalic atraumatic, neck supple	  Lymphatic: No lymphadenopathy  Cardiovascular: Normal S1 S2, No JVD, No murmurs, No edema  Respiratory: Lungs clear to auscultation	  Psychiatry: A & O x 3, Mood & affect appropriate  Gastrointestinal:  Soft, Non-tender, + BS	  Skin: No rashes, No ecchymoses, No cyanosis; B/L groin without hematoma or bleeding	  Neurologic: Non-focal  Extremities: Normal range of motion, No clubbing, cyanosis or edema  Vascular: Peripheral pulses palpable 2+ bilaterally        LABS:	 	    CBC Full  -  ( 04 Jan 2019 02:42 )  WBC Count : 11.2 K/uL  Hemoglobin : 11.2 g/dL  Hematocrit : 32.3 %  Platelet Count - Automated : 297 K/uL  Mean Cell Volume : 93.8 fl  Mean Cell Hemoglobin : 32.4 pg  Mean Cell Hemoglobin Concentration : 34.6 gm/dL  Auto Neutrophil # : x  Auto Lymphocyte # : x  Auto Monocyte # : x  Auto Eosinophil # : x  Auto Basophil # : x  Auto Neutrophil % : x  Auto Lymphocyte % : x  Auto Monocyte % : x  Auto Eosinophil % : x  Auto Basophil % : x    01-04    138  |  108  |  12  ----------------------------<  116<H>  4.0   |  19<L>  |  0.84  01-03    140  |  111<H>  |  14  ----------------------------<  136<H>  3.7   |  19<L>  |  0.91    Ca    8.1<L>      04 Jan 2019 02:42  Ca    8.6      03 Jan 2019 14:14  Phos  2.8     01-04  Phos  2.6     01-03  Mg     2.1     01-04  Mg     2.2     01-03      TELEMETRY: HEYDI 70's    Study Date: 1/3/2019    PROCEDURE: Limited transthoracic echocardiogram with 2-D.  M-Mode and spectral and color flow Doppler.  INDICATION: Unspecified atrial fibrillation (I48.91)  ------------------------------------------------------------------------  Dimensions:    Normal Values:  LA:            2.0 - 4.0 cm  Ao:            2.0 - 3.8 cm  SEPTUM:        0.6 - 1.2 cm  PWT:           0.6 - 1.1 cm  LVIDd:         3.0 - 5.6 cm  LVIDs:         1.8 - 4.0 cm  EF (Visual Estimate): 45-50 %  ------------------------------------------------------------------------  Observations:  Mitral Valve: Mitral valve not well visualized.  Aortic Valve/Aorta: Aortic valve not well visualized.  Normal aortic root, aortic arch and descending thoracic  aorta.  Left Atrium: Normal left atrium.  Left Ventricle: Left ventricular ejection fraction, by  visual estimation, is 45-50%.  No wall motion abnormality.  Right Heart: Normal right atrium. Right ventricle mildly  dilated with mildly reduced systolic function.  Tricuspid  valve not well visualized. Pulmonic valve not well  visualized.  Pericardium/Pleura: Trivial pericardial effusion. No  echocardiographic signs of tamponade.  Hemodynamic: Estimated right atrial pressure is 8 mm Hg.  ------------------------------------------------------------------------  Conclusions:  Limited study to r/o pericardial effusion and tamponade.  1. Normal leftatrium.  2. Left ventricular ejection fraction, by visual  estimation, is 45-50%.  No wall motion abnormality.  3. Normal right atrium.  4. Right ventricle mildly dilated with mildly reduced  systolic function.  5. Valves not well visualized.  6. Trivial pericardial effusion. No echocardiographic signs  of tamponade.  Findings discussed with cardiology fellow at 9:50 PM.  ------------------------------------------------------------------------  Confirmed on  1/3/2019 - 21:50:20 by Ashley Panchal M.D.  ------------------------------------------------------------------------

## 2019-01-04 NOTE — PROGRESS NOTE ADULT - ASSESSMENT
64 y/o  female  w/ PMH of Mitral valve prolapse in recurrent symptomatic atrophic relation , Glaucoma, Depression, Migraines history of palpitations or racing heart. Holter monitor demonstrated a 10% atrial fibrillation burden as well as some episodes of atrial tachycardia with rates in the high 100s. s/p AFib Ablation on 1/3/19.

## 2019-01-04 NOTE — DISCHARGE NOTE ADULT - MEDICATION SUMMARY - MEDICATIONS TO TAKE
I will START or STAY ON the medications listed below when I get home from the hospital:    Imitrex 50 mg oral tablet  -- 1 tab(s) by mouth once a day (after a meal), As Needed  -- Indication: For Migraines Headaches    Maxalt 10 mg oral tablet  -- 1 tab(s) by mouth once a day  -- Indication: For Migraine Headaches    propranolol 60 mg oral capsule, extended release  -- 1 cap(s) by mouth once a day  -- Indication: For Paroxysmal atrial fibrillation    flecainide 100 mg oral tablet  -- 1 tab(s) by mouth every 12 hours  -- Indication: For Paroxysmal atrial fibrillation    rivaroxaban 20 mg oral tablet  -- 1 tab(s) by mouth every 24 hours  -- Indication: For Paroxysmal atrial fibrillation    KlonoPIN 0.25 mg oral tablet  -- 1  by mouth once a day, As Needed  -- Indication: For Sedative, anxiety, panic disorder    PROzac 20 mg oral capsule  -- 1 cap(s) by mouth once a day  -- Indication: For depression, panic disorder    Lumigan 0.01% ophthalmic solution  -- 1 drop(s) to each affected eye once a day (in the evening)  -- Indication: For glaucoma    Simbrinza 1%- 0.2% ophthalmic suspension  -- 1 drop(s) to each affected eye 3 times a day  -- Indication: For glaucoma    pantoprazole 40 mg oral delayed release tablet  -- 1 tab(s) by mouth once a day (before a meal)  -- Indication: For antacid

## 2019-01-04 NOTE — PROGRESS NOTE ADULT - ATTENDING COMMENTS
Patient is seen and examined with fellow, NP and the CCU house-staff. I agree with the history, physical and the assessment and plan.  s/p Afib ablation in NSR  on oral AC  continue with current medical therapy - including the flecainide  monitor BP

## 2019-01-04 NOTE — DISCHARGE NOTE ADULT - HOSPITAL COURSE
63F with PMH of Mitral valve prolapse in recurrent symptomatic atrophic relation , Glaucoma, Depression, Migraines history of palpitations or racing heart. Holter monitor demonstrated a 10% atrial fibrillation burden as well as some episodes of atrial tachycardia with rates in the high 100s. 1/3 s/p PAF Ablation.  Groin sutures removed per protocol, sites remain soft no hematoma.  Course c/b a single episode of symptomatic hypotension with SBP in the 70s (remained in NSR 70s) s/p stat TTE revealing trace pericardial effusion, no tamponade.  Patient given 1L IVF bolus with improvement in BP.  Propranolol LA dose decreased.  Resumed on Flecainide.  Patient hemodynamically stable for dsicharge.  Ambulating with no difficulty.  To follow up outpatient with Dr. Benavides as advised. 63F with PMH of Mitral valve prolapse in recurrent symptomatic atrophic relation , Glaucoma, Depression, Migraines history of palpitations or racing heart. Holter monitor demonstrated a 10% atrial fibrillation burden as well as some episodes of atrial tachycardia with rates in the high 100s. 1/3 s/p PAF Ablation.  Groin sutures removed per protocol, sites remain soft no hematoma.  Course c/b a single episode of symptomatic hypotension with SBP in the 70s (remained in NSR 70s) s/p stat TTE revealing trace pericardial effusion, no tamponade.  Patient given 1L IVF bolus with improvement in BP.  Propranolol LA dose decreased.  Resumed on Flecainide.  Patient hemodynamically stable for discharge  Ambulating with no difficulty.  Continue Protonix and mechanical soft diet for 1 month. Continue Flecainide  and Inderal LA as prescribed   follow up appointment with Dr. KIA Benavides at set date 2/25/19 @ [id number].

## 2019-01-04 NOTE — DISCHARGE NOTE ADULT - PLAN OF CARE
S/P ablation.  Your heart rate and rhythm will be controlled and you will remain in normal sinus rhythm Continue with your cardiologist and primary care MD. Continue your current medications. Call your physician for palpitations, feelings of rapid heart beat, lightheadedness, or dizziness.  No heavy lifting, strenuous activity, bending, straining, or unnecessary stair climbing for 2 weeks. No driving for 2 days. You may shower 24 hours following the procedure but avoid baths/swimming for 1 week. Check your groin site for bleeding and/or swelling daily following procedure and call your doctor immediately if it occurs or if you experience increased pain at the site. Follow up with your cardiologist in 1-2 weeks. You may call [facility]  if you have any questions/concerns regarding your procedure [phone].

## 2019-01-04 NOTE — DISCHARGE NOTE ADULT - ADDITIONAL INSTRUCTIONS
See above Continue Protonix and mechanical soft diet for 1 month   Continue Flecainide  and Inderal LA as prescribed   follow up appointment with Dr. KIA Benavides at set date 2/25/19 @ 3364.

## 2019-01-04 NOTE — DISCHARGE NOTE ADULT - PATIENT PORTAL LINK FT
You can access the [facility] Patient Portal, offered by [facility], by registering with the following website: [email or web address]

## 2019-01-04 NOTE — DISCHARGE NOTE ADULT - CARE PROVIDER_API CALL
Mis Benavides (MD), Cardiac Electrophysiology; Cardiovascular Disease; Internal Medicine  [address] [place] [address]  Phone: [phone]  Fax: [phone]

## 2019-01-04 NOTE — PROGRESS NOTE ADULT - PROBLEM SELECTOR PLAN 1
Monitor groin site for bleeding or hematoma  Continue Xarelto 20mg qd  Continue Flecainide 100mg q12H and Inderal LA 60mg qd  PPI and mechanical soft diet for 30 days  F/u with Dr Benavides on 2/25/19 at 1:30pm  Discharge plan today

## 2019-01-04 NOTE — PROGRESS NOTE ADULT - SUBJECTIVE AND OBJECTIVE BOX
Admission date:  CHIEF COMPLAINT:  HPI:    INTERVAL HISTORY: Patient seen and examined, denies CP, palpitations, dyspnea, orthopnea, PND and able to lay flat; Overnight patient was hypotensive sbp 70s decreased Inderal LA.    REVIEW OF SYSTEMS:    CONSTITUTIONAL: No weakness, fevers or chills  EYES/ENT: No visual changes;  No vertigo or throat pain   NECK: No pain or stiffness  RESPIRATORY: No cough, wheezing, hemoptysis; No shortness of breath  CARDIOVASCULAR: No chest pain or palpitations  GASTROINTESTINAL: No abdominal or epigastric pain. No nausea, vomiting, or hematemesis; No diarrhea or constipation. No melena or hematochezia.  GENITOURINARY: No dysuria, frequency or hematuria  NEUROLOGICAL: No numbness or weakness  SKIN: No itching, rashes      MEDICATIONS  (STANDING):  flecainide 100 milliGRAM(s) Oral every 12 hours  FLUoxetine 20 milliGRAM(s) Oral daily  latanoprost 0.005% Ophthalmic Solution 1 Drop(s) Both EYES at bedtime  pantoprazole    Tablet 40 milliGRAM(s) Oral before breakfast  propranolol LA 60 milliGRAM(s) Oral daily  rivaroxaban 20 milliGRAM(s) Oral every 24 hours  sodium chloride 0.9% lock flush 3 milliLiter(s) IV Push every 8 hours    MEDICATIONS  (PRN):  benzocaine 15 mG/menthol 3.6 mG Lozenge 1 Lozenge Oral every 2 hours PRN Sore Throat  clonazePAM Tablet 0.25 milliGRAM(s) Oral daily PRN anxiety, panic disorder  simethicone 80 milliGRAM(s) Chew every 8 hours PRN Gas  simethicone 80 milliGRAM(s) Chew every 6 hours PRN Indigestion, Gas  SUMAtriptan 50 milliGRAM(s) Oral after breakfast PRN Migraine      Objective:  ICU Vital Signs Last 24 Hrs  T(C): 37 (2019 06:00), Max: 37 (2019 06:00)  T(F): 98.6 (2019 06:00), Max: 98.6 (2019 06:00)  HR: 72 (2019 07:00) (64 - 90)  BP: 82/43 (2019 07:00) (65/50 - 139/72)  BP(mean): 55 (2019 07:00) (52 - 93)  ABP: --  ABP(mean): --  RR: 17 (2019 07:00) (13 - 28)  SpO2: 97% (2019 07:00) (96% - 100%)       @ 07:01  -  - @ 07:00  --------------------------------------------------------  IN: 2075 mL / OUT: 900 mL / NET: 1175 mL      Daily Height in cm: 175.26 (2019 12:15)    Daily Weight in k (2019 04:00)    PHYSICAL EXAM:    REVIEW OF SYSTEMS:    CONSTITUTIONAL: No weakness, fevers or chills  EYES/ENT: No visual changes;  No vertigo or throat pain   NECK: No pain or stiffness  RESPIRATORY: No cough, wheezing, hemoptysis; No shortness of breath  CARDIOVASCULAR: No chest pain or palpitations  GASTROINTESTINAL: No abdominal or epigastric pain. No nausea, vomiting, or hematemesis; No diarrhea or constipation. No melena or hematochezia.  GENITOURINARY: No dysuria, frequency or hematuria  NEUROLOGICAL: No numbness or weakness  SKIN: No itching, rashes        TELEMETRY:     EKG:     IMAGING:    Labs:                          11.2   11.2  )-----------( 297      ( 2019 02:42 )             32.3     01-04    138  |  108  |  12  ----------------------------<  116<H>  4.0   |  19<L>  |  0.84    Ca    8.1<L>      2019 02:42  Phos  2.8     -04  Mg     2.1     01-04        PT/INR - ( 2019 08:38 )   PT: 11.4 sec;   INR: 0.99 ratio         PTT - ( 2019 08:38 )  PTT:33.0 sec        HEALTH ISSUES - PROBLEM Dx:

## 2019-01-04 NOTE — DISCHARGE NOTE ADULT - CARE PLAN
Principal Discharge DX:	Paroxysmal atrial fibrillation  Goal:	S/P ablation.  Your heart rate and rhythm will be controlled and you will remain in normal sinus rhythm  Assessment and plan of treatment:	Continue with your cardiologist and primary care MD. Continue your current medications. Call your physician for palpitations, feelings of rapid heart beat, lightheadedness, or dizziness.  No heavy lifting, strenuous activity, bending, straining, or unnecessary stair climbing for 2 weeks. No driving for 2 days. You may shower 24 hours following the procedure but avoid baths/swimming for 1 week. Check your groin site for bleeding and/or swelling daily following procedure and call your doctor immediately if it occurs or if you experience increased pain at the site. Follow up with your cardiologist in 1-2 weeks. You may call [facility]  if you have any questions/concerns regarding your procedure [phone].

## 2019-01-07 DIAGNOSIS — I34.1 NONRHEUMATIC MITRAL (VALVE) PROLAPSE: ICD-10-CM

## 2019-01-07 DIAGNOSIS — I48.0 PAROXYSMAL ATRIAL FIBRILLATION: ICD-10-CM

## 2019-01-07 DIAGNOSIS — F32.9 MAJOR DEPRESSIVE DISORDER, SINGLE EPISODE, UNSPECIFIED: ICD-10-CM

## 2019-01-07 DIAGNOSIS — H40.9 UNSPECIFIED GLAUCOMA: ICD-10-CM

## 2019-01-07 DIAGNOSIS — G43.909 MIGRAINE, UNSPECIFIED, NOT INTRACTABLE, WITHOUT STATUS MIGRAINOSUS: ICD-10-CM

## 2019-01-11 ENCOUNTER — RECORD ABSTRACTING (OUTPATIENT)
Age: 64
End: 2019-01-11

## 2019-01-11 DIAGNOSIS — Z78.9 OTHER SPECIFIED HEALTH STATUS: ICD-10-CM

## 2019-01-11 DIAGNOSIS — G43.909 MIGRAINE, UNSPECIFIED, NOT INTRACTABLE, W/OUT STATUS MIGRAINOSUS: ICD-10-CM

## 2019-01-11 DIAGNOSIS — Z86.69 PERSONAL HISTORY OF OTHER DISEASES OF THE NERVOUS SYSTEM AND SENSE ORGANS: ICD-10-CM

## 2019-01-19 ENCOUNTER — RX RENEWAL (OUTPATIENT)
Age: 64
End: 2019-01-19

## 2019-02-08 ENCOUNTER — APPOINTMENT (OUTPATIENT)
Dept: PSYCHIATRY | Facility: CLINIC | Age: 64
End: 2019-02-08
Payer: COMMERCIAL

## 2019-02-08 PROCEDURE — 99214 OFFICE O/P EST MOD 30 MIN: CPT

## 2019-02-08 RX ORDER — BIMATOPROST 0.1 MG/ML
0.01 SOLUTION/ DROPS OPHTHALMIC
Refills: 0 | Status: ACTIVE | COMMUNITY

## 2019-02-08 RX ORDER — FLUOXETINE HCL 10 MG
10 TABLET ORAL
Refills: 0 | Status: DISCONTINUED | COMMUNITY
End: 2019-02-08

## 2019-02-08 RX ORDER — RANITIDINE HYDROCHLORIDE 300 MG/1
300 CAPSULE ORAL
Qty: 30 | Refills: 0 | Status: DISCONTINUED | COMMUNITY
Start: 2018-02-08 | End: 2019-02-08

## 2019-02-08 RX ORDER — BRINZOLAMIDE/BRIMONIDINE TARTRATE 10; 2 MG/ML; MG/ML
1-0.2 SUSPENSION/ DROPS OPHTHALMIC
Refills: 0 | Status: ACTIVE | COMMUNITY
Start: 2017-07-19

## 2019-02-08 RX ORDER — FLUOXETINE HYDROCHLORIDE 40 MG/1
40 CAPSULE ORAL
Refills: 0 | Status: DISCONTINUED | COMMUNITY
End: 2019-02-08

## 2019-02-08 RX ORDER — APIXABAN 5 MG/1
5 TABLET, FILM COATED ORAL
Qty: 60 | Refills: 5 | Status: DISCONTINUED | COMMUNITY
Start: 2019-02-06 | End: 2019-02-08

## 2019-02-25 ENCOUNTER — APPOINTMENT (OUTPATIENT)
Dept: ELECTROPHYSIOLOGY | Facility: CLINIC | Age: 64
End: 2019-02-25
Payer: COMMERCIAL

## 2019-02-25 ENCOUNTER — NON-APPOINTMENT (OUTPATIENT)
Age: 64
End: 2019-02-25

## 2019-02-25 VITALS
OXYGEN SATURATION: 97 % | WEIGHT: 156 LBS | HEART RATE: 56 BPM | DIASTOLIC BLOOD PRESSURE: 87 MMHG | HEIGHT: 69 IN | SYSTOLIC BLOOD PRESSURE: 127 MMHG | BODY MASS INDEX: 23.11 KG/M2

## 2019-02-25 PROCEDURE — 99213 OFFICE O/P EST LOW 20 MIN: CPT

## 2019-02-25 PROCEDURE — 93000 ELECTROCARDIOGRAM COMPLETE: CPT

## 2019-02-25 RX ORDER — RIZATRIPTAN BENZOATE 10 MG/1
10 TABLET ORAL
Qty: 12 | Refills: 0 | Status: DISCONTINUED | COMMUNITY
Start: 2018-12-06 | End: 2019-02-25

## 2019-02-25 NOTE — HISTORY OF PRESENT ILLNESS
[FreeTextEntry1] : I had the pleasure of seeing your patient Allyssa Quezada today in the arrhythmia clinic of [facility]. 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 The patient had presented to the emergency room in October with a few week history of palpitations or racing heart. It was not associated with any presyncope or syncope nor did she have chest pain, shortness of breath or diaphoresis. This was new to her. She reported not eating or drinking the day prior to the episode. She was also recently started on Prozac. She was started on metoprolol XL 25 mg and given aspirin. A workup included a normal echocardiogram and she was discharged home. On followup a Holter monitor demonstrated a 10% atrial fibrillation burden as well as some episodes of atrial tachycardia with rates in the high 100s. Her minimum heart rates were in the 50s. It was recommended that she start on anticoagulation but she chose not to. Echocardiogram: Normal left ventricular size and function, mitral valve prolapse. \par \par When I saw her in clinic she was amenable to starting anticoagulation and started to Xarelto. However she did not tolerate this due to gastritis and was switched to Eliquis. She opted for an ablative approach and underwent an ablation procedure on January 3, 2019. This included a pulmonary vein isolation and cava tricuspid isthmus line. She made a good recovery however early on she did have recurrent atrial fibrillation which  out over the first few weeks. In addition she was complaining of flatulence on the Eliquis but reported she was continuing to take it. She returns to clinic today for followup.\par \par Overall she is doing quite well. She does not think she has had any more atrial fibrillation and she has even gone back to trying a glass of wine which was always a trigger in the past. In addition since we started the propranolol her migraines are dramatically improved. Her blood pressure today was 127/87 her pulse is 56. EKG revealed sinus rhythm at 57 beats per minute with an incomplete right bundle branch block. The patient does report that she was only taking her anticoagulation once a day as opposed to twice a day.

## 2019-02-25 NOTE — DISCUSSION/SUMMARY
[FreeTextEntry1] : In summary the patient is a 63-year-old woman with recurrent symptomatic paroxysmal he took ablation. She is status post ablation on January 3, 2019. She is doing well off antiarrhythmics. She is bothered by her anticoagulation and she thinks it may be due to an interaction with her Prozac. She is going to try and reduce the dose of Prozac and move it to the middle of the day to see if she can tolerate the Eliquis twice a day. Given her only risk factor is that she is a woman there is a chance after the 3 month waiting period we could wean her off the anticoagulation. We discussed placing an implantable loop recorder to monitor you to fibrillation to help guide anticoagulation and she is amenable to this.

## 2019-03-01 NOTE — ED ADULT NURSE NOTE - CADM POA PRESS ULCER
Per order of Dr. Natalie Askew a post void residual was done via Bladder scanner. PVR was 310 cc.      Above performed by Taco Arora MA No

## 2019-03-14 ENCOUNTER — APPOINTMENT (OUTPATIENT)
Dept: PAIN MANAGEMENT | Facility: CLINIC | Age: 64
End: 2019-03-14

## 2019-03-21 ENCOUNTER — OUTPATIENT (OUTPATIENT)
Dept: OUTPATIENT SERVICES | Facility: HOSPITAL | Age: 64
LOS: 1 days | End: 2019-03-21
Payer: COMMERCIAL

## 2019-03-21 VITALS
DIASTOLIC BLOOD PRESSURE: 67 MMHG | RESPIRATION RATE: 16 BRPM | OXYGEN SATURATION: 98 % | TEMPERATURE: 98 F | HEIGHT: 69 IN | HEART RATE: 67 BPM | SYSTOLIC BLOOD PRESSURE: 98 MMHG | WEIGHT: 151.02 LBS

## 2019-03-21 DIAGNOSIS — Z98.890 OTHER SPECIFIED POSTPROCEDURAL STATES: Chronic | ICD-10-CM

## 2019-03-21 DIAGNOSIS — I48.91 UNSPECIFIED ATRIAL FIBRILLATION: ICD-10-CM

## 2019-03-21 PROCEDURE — 33285 INSJ SUBQ CAR RHYTHM MNTR: CPT

## 2019-03-21 PROCEDURE — C1764: CPT

## 2019-03-21 PROCEDURE — 99204 OFFICE O/P NEW MOD 45 MIN: CPT

## 2019-03-21 PROCEDURE — 93005 ELECTROCARDIOGRAM TRACING: CPT

## 2019-03-21 PROCEDURE — 93010 ELECTROCARDIOGRAM REPORT: CPT

## 2019-03-21 NOTE — H&P CARDIOLOGY - HISTORY OF PRESENT ILLNESS
62 y/o  female  w/ PMH of Mitral valve prolapse, Glaucoma, Depression, Migraines, AF, s/p Ablation in 1/2019 presents today for Loop implant to r/o any arrhythmia. Patient states she wants to stop Eliquis, was seen and evaluated by Dr. Benavides  and recommended for Loop implant to monitor A fib to guide anticoagulation discontinuation. Denies chest pain, SOB, palpitation / syncope but c/o occasional feeling of food stuck in  her chest while eating especially when eating fried food.

## 2019-03-21 NOTE — H&P CARDIOLOGY - PSH
cholecystectomy  1989  H/O cardiac radiofrequency ablation  for A fib in Jan 2019  Status Post partial  Left Oophorectomy  1982

## 2019-03-21 NOTE — H&P CARDIOLOGY - PMH
Atrial fibrillation  s/p Ablation  Gastritis    Glaucoma    Heart Murmur  Denies syncope, denies dizziness, denies palpitations.  last echo done [id number]  Migraine Headache

## 2019-03-23 PROBLEM — I48.91 UNSPECIFIED ATRIAL FIBRILLATION: Chronic | Status: ACTIVE | Noted: 2019-03-21

## 2019-03-28 ENCOUNTER — APPOINTMENT (OUTPATIENT)
Dept: ELECTROPHYSIOLOGY | Facility: CLINIC | Age: 64
End: 2019-03-28
Payer: COMMERCIAL

## 2019-03-28 VITALS
HEART RATE: 66 BPM | DIASTOLIC BLOOD PRESSURE: 70 MMHG | SYSTOLIC BLOOD PRESSURE: 107 MMHG | BODY MASS INDEX: 22.96 KG/M2 | WEIGHT: 155 LBS | HEIGHT: 69 IN | OXYGEN SATURATION: 98 %

## 2019-03-28 PROCEDURE — 93291 INTERROG DEV EVAL SCRMS IP: CPT

## 2019-03-29 ENCOUNTER — TRANSCRIPTION ENCOUNTER (OUTPATIENT)
Age: 64
End: 2019-03-29

## 2019-03-29 ENCOUNTER — OTHER (OUTPATIENT)
Age: 64
End: 2019-03-29

## 2019-04-10 ENCOUNTER — RX RENEWAL (OUTPATIENT)
Age: 64
End: 2019-04-10

## 2019-04-11 ENCOUNTER — MEDICATION RENEWAL (OUTPATIENT)
Age: 64
End: 2019-04-11

## 2019-04-22 ENCOUNTER — APPOINTMENT (OUTPATIENT)
Dept: ELECTROPHYSIOLOGY | Facility: HOSPITAL | Age: 64
End: 2019-04-22
Payer: COMMERCIAL

## 2019-04-22 PROCEDURE — 93299: CPT

## 2019-04-22 PROCEDURE — 93298 REM INTERROG DEV EVAL SCRMS: CPT

## 2019-04-25 ENCOUNTER — APPOINTMENT (OUTPATIENT)
Dept: PAIN MANAGEMENT | Facility: CLINIC | Age: 64
End: 2019-04-25
Payer: COMMERCIAL

## 2019-04-25 VITALS
HEART RATE: 64 BPM | DIASTOLIC BLOOD PRESSURE: 77 MMHG | BODY MASS INDEX: 22.96 KG/M2 | WEIGHT: 155 LBS | HEIGHT: 69 IN | SYSTOLIC BLOOD PRESSURE: 119 MMHG

## 2019-04-25 DIAGNOSIS — G43.709 CHRONIC MIGRAINE W/OUT AURA, NOT INTRACTABLE, W/OUT STATUS MIGRAINOSUS: ICD-10-CM

## 2019-04-25 PROCEDURE — 99214 OFFICE O/P EST MOD 30 MIN: CPT

## 2019-04-30 NOTE — HISTORY OF PRESENT ILLNESS
[FreeTextEntry1] : 64 y/o female patient presents today for a follow up. Propranolol 60 mg is taken and she notes that it has decreased the frequency of her migraines. Within a given month she reports experiencing only 4 migraine days. When a migraine does occur she uses Sumatriptan 50 mg, which is half the prescribed amount. Additionally she notes that if she does not eat sufficiently during the day, the next day she will wake up with a HA.

## 2019-04-30 NOTE — PHYSICAL EXAM
[General Appearance - Alert] : alert [Affect] : the affect was normal [Person] : oriented to person [Place] : oriented to place [Cranial Nerves Oculomotor (III)] : extraocular motion intact [Time] : oriented to time [Motor Strength] : muscle strength was normal in all four extremities [Outer Ear] : the ears and nose were normal in appearance [Sclera] : the sclera and conjunctiva were normal [Neck Appearance] : the appearance of the neck was normal [] : no respiratory distress [Abnormal Walk] : normal gait [Skin Color & Pigmentation] : normal skin color and pigmentation [FreeTextEntry1] : No signs of toxicity.

## 2019-04-30 NOTE — END OF VISIT
[>50% of Time Spent on Counseling for ____] : Greater than 50% of the encounter time was spent on counseling for [unfilled] [Time Spent: ___ minutes] : I have spent [unfilled] minutes of face to face time with the patient [FreeTextEntry3] : This note was authored by Jostin Barrow working as a medical scribe for Dr. Liban Bolton. The note was reviewed, edited, and revised by Dr. Liban Bolton who is in agreement with the exam findings, and treatment plan. (4/25/19)

## 2019-04-30 NOTE — ASSESSMENT
[FreeTextEntry1] : 64 y/o female patient with chronic migraines presents today for a follow up. Following her examination today it is determined that the Propranolol continues to provide a reduction in the frequency of her migraines. Sumatriptan also provides relief when the migraines occur. It is therefore my opinion that no changes need to be made at this time.\par \par During her visit today: \par 1) No changes are made to her current medical regimen. \par 2) She is to follow up in 3 months or sooner if clinically indicated.

## 2019-05-09 ENCOUNTER — APPOINTMENT (OUTPATIENT)
Dept: PSYCHIATRY | Facility: CLINIC | Age: 64
End: 2019-05-09
Payer: COMMERCIAL

## 2019-05-09 DIAGNOSIS — F33.1 MAJOR DEPRESSIVE DISORDER, RECURRENT, MODERATE: ICD-10-CM

## 2019-05-09 PROCEDURE — 99214 OFFICE O/P EST MOD 30 MIN: CPT

## 2019-05-23 ENCOUNTER — APPOINTMENT (OUTPATIENT)
Dept: ELECTROPHYSIOLOGY | Facility: HOSPITAL | Age: 64
End: 2019-05-23
Payer: COMMERCIAL

## 2019-05-23 PROCEDURE — 93299: CPT

## 2019-05-23 PROCEDURE — 93298 REM INTERROG DEV EVAL SCRMS: CPT

## 2019-06-14 ENCOUNTER — RX CHANGE (OUTPATIENT)
Age: 64
End: 2019-06-14

## 2019-06-14 ENCOUNTER — MEDICATION RENEWAL (OUTPATIENT)
Age: 64
End: 2019-06-14

## 2019-06-17 ENCOUNTER — RX CHANGE (OUTPATIENT)
Age: 64
End: 2019-06-17

## 2019-06-25 ENCOUNTER — APPOINTMENT (OUTPATIENT)
Dept: ELECTROPHYSIOLOGY | Facility: CLINIC | Age: 64
End: 2019-06-25
Payer: COMMERCIAL

## 2019-06-25 PROCEDURE — 93298 REM INTERROG DEV EVAL SCRMS: CPT

## 2019-06-25 PROCEDURE — 93299: CPT

## 2019-07-10 ENCOUNTER — RX RENEWAL (OUTPATIENT)
Age: 64
End: 2019-07-10

## 2019-07-30 ENCOUNTER — APPOINTMENT (OUTPATIENT)
Dept: ELECTROPHYSIOLOGY | Facility: CLINIC | Age: 64
End: 2019-07-30
Payer: COMMERCIAL

## 2019-07-30 PROCEDURE — 93298 REM INTERROG DEV EVAL SCRMS: CPT

## 2019-07-30 PROCEDURE — 93299: CPT

## 2019-08-12 NOTE — H&P CARDIOLOGY - HEIGHT IN INCHES
OFFICE VISIT      Patient: Mariya Salgado Date of Service: 2019   : 1947 MRN: [id number]     SUBJECTIVE:     Chief Complaint   Patient presents with   • Follow-up     6 weeks depression, DM,HLD, HTN and Rt hemiplegia.   • Convey Results       HISTORY OF PRESENT ILLNESS:  Mariya Salgado is a 71 year old female who presents today for complaints listed above  Constipation better with miralax  Rt shoulder pain with ROM. She is resistant to do rehab for the rt shoulder.  Walking with a cane. Walking  30 feet each time and about 4 x /day.  Home BP  Not checked  Home -140  Rt hearing loss noted for long time.      Review of Systems   Constitutional: Negative.    HENT: Positive for hearing loss.    Eyes: Negative.    Respiratory: Negative.    Cardiovascular: Negative.    Gastrointestinal: Negative.  Negative for constipation.   Genitourinary: Negative.         Nocturia 1-2xnite   Musculoskeletal: Positive for arthralgias.        Rt shoulder pain   Skin: Negative.    Neurological: Negative.    Psychiatric/Behavioral: Positive for confusion.        Sequelae of the CVA,       PAST MEDICAL HISTORY:  Past Medical History:   Diagnosis Date   • History of colonoscopy with polypectomy 2013    repeat in 5 years       ACTIVE PROBLEM LIST:  Patient Active Problem List   Diagnosis   • Debility   • Depression   • Hypertension   • Asthma   • Type 2 diabetes mellitus without complication, with long-term current use of insulin (CMS/[facility])   • Other hyperlipidemia   • Unspecified visual disturbance   • Primary osteoarthritis of right knee   • Other thalassemias (CMS/HCC)   • PEG (percutaneous endoscopic gastrostomy) status (CMS/HCC)   • Paranoia (CMS/HCC)   • Nontraumatic subcortical hemorrhage of left cerebral hemisphere (CMS/HCC)   • Impacted cerumen of right ear       MEDICATIONS:  Current Outpatient Medications   Medication Sig   • atorvastatin (LIPITOR) 10 MG tablet Take 0.5 tablets by mouth daily.   • losartan (COZAAR) 50  MG tablet Take 1 tablet by mouth 2 times daily.   • amLODIPine (NORVASC) 10 MG tablet Take 0.5 tablets by mouth 2 times daily.   • SOFTCLIX LANCETS Misc by Misc. (Non-Drug;Combo Route) route.   • blood glucose test strip by Misc. (Non-Drug;Combo Route) route.   • Insulin Pen Needle (DROPLET PEN NEEDLES) 32G X 5 MM Misc by Misc. (Non-Drug;Combo Route) route.   • escitalopram (LEXAPRO) 10 MG tablet Take 1.5 tablets by mouth daily.   • carvedilol (COREG) 25 MG tablet Take 1 tablet by mouth 2 times daily (with meals).   • Cholecalciferol (VITAMIN D3) 2000 units capsule Take 2,000 Units by mouth daily.   • metFORMIN (GLUCOPHAGE) 500 MG tablet Take 1 tablet by mouth 2 times daily (with meals).   • melatonin 5 MG Take 2 tablets by mouth nightly.   • polyethylene glycol (GLYCOLAX, MIRALAX) packet Take 17 g by mouth daily.     No current facility-administered medications for this visit.        ALLERGIES:  ALLERGIES:   Allergen Reactions   • Ace Inhibitors Other (See Comments)     Unknown reaction-- per pt's chart   • Lisinopril Other (See Comments)       PAST SURGICAL HISTORY:  No past surgical history on file.    FAMILY HISTORY:  Family History   Problem Relation Age of Onset   • Heart disease Mother    • Hypertension Mother    • Cancer Father        SOCIAL HISTORY:  Social History     Tobacco Use   • Smoking status: Never Smoker   • Smokeless tobacco: Never Used   Substance Use Topics   • Alcohol use: Never     Frequency: Never   • Drug use: Not on file       OBJECTIVE:     Visit Vitals  /71 [place] [facility] [place] Sitting, Cuff Size: Regular)   Pulse 66   Temp 97.6 °F (36.4 °C) (Temporal)   Ht 5' 6\" (1.676 m)   Wt 53.7 kg (118 lb 6.2 oz)   SpO2 98%   BMI 19.11 kg/m²       Physical Exam  Constitutional: Well-developed and well-nourished. Under weight.  HENT: Normocephalic and atraumatic. External ears normal. Nose normal. Oropharynx is clear and moist. Rt ear wax impacted.  Eyes: Normal conjunctivae.  PERRL. EOMI.  Neck: Normal range of motion. Neck supple. No thyromegaly present. No cervical adenopathy. Right carotid bruit negative. Left carotid bruit negative.  Cardiovascular: Regular rate and rhythm. Normal heart sounds. No murmur heard. No leg edema.  Pulmonary/Chest: Normal respiratory effort. Breath sounds normal.  Abdominal: Soft. Non-tender. Non-distended. Bowel sounds are normal. No guarding. No masses. No hepatomegaly. No splenomegaly. G tube in situ.  Musculoskeletal: Normal range of motion of knees and hips. SLRT negative. TONE negative. Rt shoulder pain from the frozen shoulder. Pain on ROM also.  Neurological: Alert and oriented to person, place, and time. No cranial nerve deficit except aphasia which is better now. Not responding to conversation well and slower. Rt hemiplegic pattern as before. Rt  Fore arm is mildly edematous.  Skin: Skin is warm and dry. No rash noted.  Psychiatric: Normal mood and affect. Normal behavior. Judgment and thought content normal.      DIAGNOSTIC STUDIES:   LAB RESULTS:    Lab Results Reviewed and   Lab Services on 08/10/2019   Component Date Value Ref Range Status   • WBC 08/10/2019 7.3  4.2 - 11.0 K/mcL Final   • RBC 08/10/2019 5.27* 4.00 - 5.20 mil/mcL Final   • HGB 08/10/2019 10.1* 12.0 - 15.5 g/dL Final   • HCT 08/10/2019 33.4* 36.0 - 46.5 % Final   • MCV 08/10/2019 63.4* 78.0 - 100.0 fl Final   • MCH 08/10/2019 19.2* 26.0 - 34.0 pg Final   • MCHC 08/10/2019 30.2* 32.0 - 36.5 g/dL Final   • RDW-CV 08/10/2019 15.7* 11.0 - 15.0 % Final   • PLT 08/10/2019 400  140 - 450 K/mcL Final   • NRBC 08/10/2019 0  0 /100 WBC Final   • DIFF TYPE 08/10/2019 AUTOMATED DIFFERENTIAL   Final   • Neutrophil 08/10/2019 63  % Final   • LYMPH 08/10/2019 26  % Final   • MONO 08/10/2019 6  % Final   • EOSIN 08/10/2019 4  % Final   • BASO 08/10/2019 1  % Final   • Percent Immature Granuloctyes 08/10/2019 0  % Final   • Absolute Neutrophil 08/10/2019 4.5  1.8 - 7.7 K/mcL Final   •  Absolute Lymph 08/10/2019 1.9  1.0 - 4.0 K/mcL Final   • Absolute Mono 08/10/2019 0.5  0.3 - 0.9 K/mcL Final   • Absolute Eos 08/10/2019 0.3  0.1 - 0.5 K/mcL Final   • Absolute Baso 08/10/2019 0.1  0.0 - 0.3 K/mcL Final   • Absolute Immature Granulocytes 08/10/2019 0.0  0 - 0.2 K/mcl Final   • Fasting Status 08/10/2019 FASTING  hrs Final   • Sodium 08/10/2019 138  135 - 145 mmol/L Final   • Potassium 08/10/2019 4.2  3.4 - 5.1 mmol/L Final   • Chloride 08/10/2019 106  98 - 107 mmol/L Final   • Carbon Dioxide 08/10/2019 24  21 - 32 mmol/L Final   • Anion Gap 08/10/2019 12  10 - 20 mmol/L Final   • Glucose 08/10/2019 134* 65 - 99 mg/dL Final   • BUN 08/10/2019 23* 6 - 20 mg/dL Final   • Creatinine 08/10/2019 0.63  0.51 - 0.95 mg/dL Final   • GFR Estimate,  08/10/2019 >90   Final   • GFR Estimate, Non  08/10/2019 90   Final   • BUN/Creatinine Ratio 08/10/2019 36* 7 - 25 Final   • CALCIUM 08/10/2019 9.5  8.4 - 10.2 mg/dL Final   • TOTAL BILIRUBIN 08/10/2019 0.5  0.2 - 1.0 mg/dL Final   • AST/SGOT 08/10/2019 14  <38 Units/L Final   • ALT/SGPT 08/10/2019 17  <64 Units/L Final   • ALK PHOSPHATASE 08/10/2019 57  45 - 117 Units/L Final   • TOTAL PROTEIN 08/10/2019 7.7  6.4 - 8.2 g/dL Final   • Albumin 08/10/2019 3.8  3.6 - 5.1 g/dL Final   • GLOBULIN 08/10/2019 3.9  2.0 - 4.0 g/dL Final   • A/G Ratio, Serum 08/10/2019 1.0  1.0 - 2.4 Final   • Hemoglobin A1C 08/10/2019 6.0* 4.5 - 5.6 % Final   • FASTING STATUS 08/10/2019 FASTING  hrs Final   • CHOLESTEROL 08/10/2019 175  <200 mg/dL Final   • CALCULATED LDL 08/10/2019 98  <130 mg/dL Final   • HDL 08/10/2019 56  >49 mg/dL Final   • TRIGLYCERIDE 08/10/2019 106  <150 mg/dL Final   • CALCULATED NON HDL 08/10/2019 119  mg/dL Final   • CHOL/HDL 08/10/2019 3.1  <4.5 Final   • TSH 08/10/2019 2.037  0.350 - 5.000 mcUnits/mL Final        Assessment AND PLAN:   This is a 71 year old year-old female who presents with following diagnoses.    Assessment    Problem List Items Addressed This Visit        Nervous    Nontraumatic subcortical hemorrhage of left cerebral hemisphere (CMS/HCC) - Primary     This is better now but the rt limbs are still paralyzed. She needs to get more PT.OT. to see PM& R service.         Relevant Orders    SERVICE TO PHYSICAL THERAPY       Otologic    Impacted cerumen of right ear     Give debrox soln 4 drops x3/day for 3 days. RTC 3 days for irrigation.            Circulatory    Hypertension     Hypertension is improving with treatment.  Continue current treatment regimen.  Blood pressure will be reassessed at the next regular appointment.         Relevant Medications    atorvastatin (LIPITOR) 10 MG tablet       Endocrine    Type 2 diabetes mellitus without complication, with long-term current use of insulin (CMS/HCC)     Diabetes is improving with treatment.   Continue current treatment regimen.  Diabetes will be reassessed in 2 months with blood tests..         Other hyperlipidemia     Lipid abnormalities are improving with treatment.  Nutritional counseling was provided. and Pharmacotherapy as ordered.in view of hx of DM, will give low dose lipitor 5 mg po qd and recheck.  Lipids will be reassessed with blood tests in 2 months..         Relevant Medications    atorvastatin (LIPITOR) 10 MG tablet        RTC 2 months with blood  Return in about 2 months (around 10/12/2019), or if symptoms worsen or fail to improve.    Instructions provided as documented in the AVS.    The patient indicated understanding of the diagnosis and agreed with the plan of care.   9

## 2019-08-30 ENCOUNTER — APPOINTMENT (OUTPATIENT)
Dept: ELECTROPHYSIOLOGY | Facility: CLINIC | Age: 64
End: 2019-08-30
Payer: COMMERCIAL

## 2019-08-30 PROCEDURE — 93298 REM INTERROG DEV EVAL SCRMS: CPT

## 2019-08-30 PROCEDURE — 93299: CPT

## 2019-09-12 ENCOUNTER — RX RENEWAL (OUTPATIENT)
Age: 64
End: 2019-09-12

## 2019-09-16 ENCOUNTER — APPOINTMENT (OUTPATIENT)
Dept: PSYCHIATRY | Facility: CLINIC | Age: 64
End: 2019-09-16
Payer: COMMERCIAL

## 2019-09-16 VITALS — BODY MASS INDEX: 24.29 KG/M2 | WEIGHT: 164 LBS | HEIGHT: 69 IN

## 2019-09-16 PROCEDURE — 99214 OFFICE O/P EST MOD 30 MIN: CPT

## 2019-10-28 ENCOUNTER — APPOINTMENT (OUTPATIENT)
Dept: PAIN MANAGEMENT | Facility: CLINIC | Age: 64
End: 2019-10-28

## 2019-10-28 ENCOUNTER — APPOINTMENT (OUTPATIENT)
Dept: PAIN MANAGEMENT | Facility: CLINIC | Age: 64
End: 2019-10-28
Payer: COMMERCIAL

## 2019-10-28 VITALS
DIASTOLIC BLOOD PRESSURE: 58 MMHG | HEIGHT: 69 IN | SYSTOLIC BLOOD PRESSURE: 95 MMHG | BODY MASS INDEX: 24.29 KG/M2 | HEART RATE: 62 BPM | WEIGHT: 164 LBS

## 2019-10-28 PROCEDURE — 99213 OFFICE O/P EST LOW 20 MIN: CPT

## 2019-10-28 RX ORDER — FLUOXETINE HYDROCHLORIDE 20 MG/1
20 TABLET ORAL DAILY
Qty: 90 | Refills: 0 | Status: DISCONTINUED | COMMUNITY
Start: 2019-05-09 | End: 2019-10-28

## 2019-10-28 NOTE — PHYSICAL EXAM
[General Appearance - Alert] : alert [General Appearance - In No Acute Distress] : in no acute distress [General Appearance - Well-Appearing] : healthy appearing [Oriented To Time, Place, And Person] : oriented to person, place, and time [Affect] : the affect was normal [Mood] : the mood was normal [Cranial Nerves Facial (VII)] : face symmetrical [Cranial Nerves Accessory (XI - Cranial And Spinal)] : head turning and shoulder shrug symmetric [Cranial Nerves Hypoglossal (XII)] : there was no tongue deviation with protrusion [Motor Strength] : muscle strength was normal in all four extremities [Sclera] : the sclera and conjunctiva were normal [PERRL With Normal Accommodation] : pupils were equal in size, round, reactive to light, with normal accommodation [Extraocular Movements] : extraocular movements were intact [] : no respiratory distress [Respiration, Rhythm And Depth] : normal respiratory rhythm and effort [Paresis Pronator Drift Right-Sided] : no pronator drift on the right [Paresis Pronator Drift Left-Sided] : no pronator drift on the left [Motor Strength Upper Extremities Bilaterally] : strength was normal in both upper extremities [Motor Strength Lower Extremities Bilaterally] : strength was normal in both lower extremities [Coordination - Dysmetria Impaired Finger-to-Nose Bilateral] : not present

## 2019-10-28 NOTE — HISTORY OF PRESENT ILLNESS
[Headache] : headache [___ Times Per Week] : [unfilled] times each week [FreeTextEntry1] : Pt returns today for a follow up visit  .\par Reports migraines " have been good " .Taking Inderal ER 60mg / day . \par Migraines occur~ 1 x / week but are less severe. Sumatriptan 50mg is effective to abort the migraine . \par Pt denies any adverse effects to her medication .\par Does have a follow up appt with Cardiology coming  up .Pt kaur have low BP , denies light headedness.

## 2019-10-30 ENCOUNTER — NON-APPOINTMENT (OUTPATIENT)
Age: 64
End: 2019-10-30

## 2019-10-30 ENCOUNTER — APPOINTMENT (OUTPATIENT)
Dept: ELECTROPHYSIOLOGY | Facility: CLINIC | Age: 64
End: 2019-10-30
Payer: COMMERCIAL

## 2019-10-30 ENCOUNTER — APPOINTMENT (OUTPATIENT)
Dept: CARDIOLOGY | Facility: CLINIC | Age: 64
End: 2019-10-30
Payer: COMMERCIAL

## 2019-10-30 VITALS
HEART RATE: 64 BPM | OXYGEN SATURATION: 98 % | BODY MASS INDEX: 23.99 KG/M2 | SYSTOLIC BLOOD PRESSURE: 111 MMHG | DIASTOLIC BLOOD PRESSURE: 72 MMHG | HEIGHT: 69 IN | WEIGHT: 162 LBS

## 2019-10-30 PROCEDURE — 93000 ELECTROCARDIOGRAM COMPLETE: CPT

## 2019-10-30 PROCEDURE — 93291 INTERROG DEV EVAL SCRMS IP: CPT

## 2019-10-30 PROCEDURE — 99215 OFFICE O/P EST HI 40 MIN: CPT

## 2019-10-30 RX ORDER — APIXABAN 5 MG/1
5 TABLET, FILM COATED ORAL
Qty: 180 | Refills: 1 | Status: DISCONTINUED | COMMUNITY
Start: 2018-12-03 | End: 2019-10-30

## 2019-10-30 RX ORDER — FLUOXETINE HYDROCHLORIDE 10 MG/1
10 CAPSULE ORAL
Qty: 90 | Refills: 0 | Status: COMPLETED | COMMUNITY
Start: 2019-07-05

## 2019-10-30 RX ORDER — OFLOXACIN 3 MG/ML
0.3 SOLUTION/ DROPS OPHTHALMIC
Qty: 5 | Refills: 0 | Status: COMPLETED | COMMUNITY
Start: 2019-07-09

## 2019-10-30 RX ORDER — TROPICAMIDE 10 MG/ML
1 SOLUTION/ DROPS OPHTHALMIC
Qty: 15 | Refills: 0 | Status: COMPLETED | COMMUNITY
Start: 2019-05-23

## 2019-10-30 RX ORDER — DIFLUPREDNATE 0.5 MG/ML
0.05 EMULSION OPHTHALMIC
Qty: 5 | Refills: 0 | Status: COMPLETED | COMMUNITY
Start: 2019-07-09

## 2019-10-30 RX ORDER — PILOCARPINE HYDROCHLORIDE OPHTHALMIC SOLUTION 20 MG/ML
2 SOLUTION/ DROPS OPHTHALMIC
Qty: 15 | Refills: 0 | Status: COMPLETED | COMMUNITY
Start: 2019-05-24

## 2019-10-30 RX ORDER — NEPAFENAC 3 MG/ML
0.3 SUSPENSION/ DROPS OPHTHALMIC
Qty: 2 | Refills: 0 | Status: COMPLETED | COMMUNITY
Start: 2019-07-09

## 2019-11-04 ENCOUNTER — APPOINTMENT (OUTPATIENT)
Dept: ELECTROPHYSIOLOGY | Facility: CLINIC | Age: 64
End: 2019-11-04
Payer: COMMERCIAL

## 2019-11-04 PROCEDURE — 93299: CPT

## 2019-11-04 PROCEDURE — 93298 REM INTERROG DEV EVAL SCRMS: CPT

## 2019-11-06 NOTE — PROGRESS NOTE ADULT - MINUTES
1930 - Bedside and Verbal shift change report given to Rita Beck RN (oncoming nurse) by General Vega and Barbara Gil RN (offgoing nurse). Report included the following information SBAR, Kardex, Intake/Output, MAR, Recent Results, Cardiac Rhythm Sinus Rhythm and Alarm Parameters . Medications verified and pt assessed. Pt resting in bed on BIPAP mask. Pt drowsy, but AOx4 - pt dislikes bipap mask, but reinforced importance for pt proper oxygenation. 0000 - Pt O2 sats dropping to 79% during oral care, pt requires continuous bipap support to maintain O2 sats >90%. 0730 - Bedside and Verbal shift change report given to Preeti Mahoney RN (oncoming nurse) by Rita Beck RN (offgoing nurse). Report included the following information SBAR, Kardex, Intake/Output, MAR, Recent Results, Cardiac Rhythm Sinus Rhythm and Alarm Parameters . 30

## 2019-11-27 ENCOUNTER — INBOUND DOCUMENT (OUTPATIENT)
Age: 64
End: 2019-11-27

## 2019-12-04 ENCOUNTER — MEDICATION RENEWAL (OUTPATIENT)
Age: 64
End: 2019-12-04

## 2019-12-05 ENCOUNTER — APPOINTMENT (OUTPATIENT)
Dept: ELECTROPHYSIOLOGY | Facility: CLINIC | Age: 64
End: 2019-12-05
Payer: COMMERCIAL

## 2019-12-05 PROCEDURE — 93298 REM INTERROG DEV EVAL SCRMS: CPT

## 2019-12-05 PROCEDURE — 93299: CPT

## 2020-01-06 ENCOUNTER — APPOINTMENT (OUTPATIENT)
Dept: ELECTROPHYSIOLOGY | Facility: CLINIC | Age: 65
End: 2020-01-06
Payer: COMMERCIAL

## 2020-01-06 PROCEDURE — 93298 REM INTERROG DEV EVAL SCRMS: CPT

## 2020-01-06 PROCEDURE — G2066: CPT

## 2020-01-16 ENCOUNTER — APPOINTMENT (OUTPATIENT)
Dept: PSYCHIATRY | Facility: CLINIC | Age: 65
End: 2020-01-16
Payer: COMMERCIAL

## 2020-01-16 VITALS — BODY MASS INDEX: 24.88 KG/M2 | HEIGHT: 69 IN | WEIGHT: 168 LBS

## 2020-01-16 DIAGNOSIS — F41.8 OTHER SPECIFIED ANXIETY DISORDERS: ICD-10-CM

## 2020-01-16 PROCEDURE — 99213 OFFICE O/P EST LOW 20 MIN: CPT

## 2020-02-06 ENCOUNTER — APPOINTMENT (OUTPATIENT)
Dept: ELECTROPHYSIOLOGY | Facility: CLINIC | Age: 65
End: 2020-02-06
Payer: COMMERCIAL

## 2020-02-06 PROCEDURE — 93298 REM INTERROG DEV EVAL SCRMS: CPT

## 2020-02-06 PROCEDURE — G2066: CPT

## 2020-03-09 ENCOUNTER — APPOINTMENT (OUTPATIENT)
Dept: ELECTROPHYSIOLOGY | Facility: CLINIC | Age: 65
End: 2020-03-09
Payer: COMMERCIAL

## 2020-03-09 PROCEDURE — 93298 REM INTERROG DEV EVAL SCRMS: CPT

## 2020-03-09 PROCEDURE — G2066: CPT

## 2020-04-10 ENCOUNTER — APPOINTMENT (OUTPATIENT)
Dept: ELECTROPHYSIOLOGY | Facility: CLINIC | Age: 65
End: 2020-04-10
Payer: COMMERCIAL

## 2020-04-10 PROCEDURE — G2066: CPT

## 2020-04-10 PROCEDURE — 93298 REM INTERROG DEV EVAL SCRMS: CPT

## 2020-04-20 ENCOUNTER — RX RENEWAL (OUTPATIENT)
Age: 65
End: 2020-04-20

## 2020-05-04 ENCOUNTER — APPOINTMENT (OUTPATIENT)
Dept: PAIN MANAGEMENT | Facility: CLINIC | Age: 65
End: 2020-05-04

## 2020-05-06 ENCOUNTER — APPOINTMENT (OUTPATIENT)
Dept: ELECTROPHYSIOLOGY | Facility: CLINIC | Age: 65
End: 2020-05-06

## 2020-05-11 ENCOUNTER — APPOINTMENT (OUTPATIENT)
Dept: ELECTROPHYSIOLOGY | Facility: CLINIC | Age: 65
End: 2020-05-11
Payer: COMMERCIAL

## 2020-05-11 PROCEDURE — 93298 REM INTERROG DEV EVAL SCRMS: CPT

## 2020-05-11 PROCEDURE — G2066: CPT

## 2020-06-03 ENCOUNTER — APPOINTMENT (OUTPATIENT)
Dept: CARDIOLOGY | Facility: CLINIC | Age: 65
End: 2020-06-03

## 2020-06-05 ENCOUNTER — APPOINTMENT (OUTPATIENT)
Dept: CARDIOLOGY | Facility: CLINIC | Age: 65
End: 2020-06-05
Payer: COMMERCIAL

## 2020-06-05 PROCEDURE — 99215 OFFICE O/P EST HI 40 MIN: CPT | Mod: 95

## 2020-06-08 ENCOUNTER — APPOINTMENT (OUTPATIENT)
Dept: ELECTROPHYSIOLOGY | Facility: CLINIC | Age: 65
End: 2020-06-08
Payer: COMMERCIAL

## 2020-06-08 PROCEDURE — 99442: CPT

## 2020-06-08 NOTE — DISCUSSION/SUMMARY
[FreeTextEntry1] : In summary the patient is a 54-year-old woman with recurrent symptomatic paroxysmal status post ablation 1/3/19. She is status post ablation on January 3, 2019. She had remained on low dose flecainide (50 BID) and Propranolol (60/day). We discussed options and patient is going to start to wean off. She will stop flecainide first and let me know how she is doing.

## 2020-06-11 ENCOUNTER — APPOINTMENT (OUTPATIENT)
Dept: ELECTROPHYSIOLOGY | Facility: CLINIC | Age: 65
End: 2020-06-11
Payer: COMMERCIAL

## 2020-06-11 PROCEDURE — G2066: CPT

## 2020-06-11 PROCEDURE — 93298 REM INTERROG DEV EVAL SCRMS: CPT

## 2020-07-16 ENCOUNTER — APPOINTMENT (OUTPATIENT)
Dept: ELECTROPHYSIOLOGY | Facility: CLINIC | Age: 65
End: 2020-07-16
Payer: COMMERCIAL

## 2020-07-16 PROCEDURE — 93298 REM INTERROG DEV EVAL SCRMS: CPT

## 2020-07-16 PROCEDURE — G2066: CPT

## 2020-07-22 ENCOUNTER — RX RENEWAL (OUTPATIENT)
Age: 65
End: 2020-07-22

## 2020-08-18 ENCOUNTER — TRANSCRIPTION ENCOUNTER (OUTPATIENT)
Age: 65
End: 2020-08-18

## 2020-08-19 ENCOUNTER — TRANSCRIPTION ENCOUNTER (OUTPATIENT)
Age: 65
End: 2020-08-19

## 2020-08-20 ENCOUNTER — APPOINTMENT (OUTPATIENT)
Dept: ELECTROPHYSIOLOGY | Facility: CLINIC | Age: 65
End: 2020-08-20
Payer: COMMERCIAL

## 2020-08-20 PROCEDURE — G2066: CPT

## 2020-08-20 PROCEDURE — 93298 REM INTERROG DEV EVAL SCRMS: CPT

## 2020-08-31 ENCOUNTER — TRANSCRIPTION ENCOUNTER (OUTPATIENT)
Age: 65
End: 2020-08-31

## 2020-09-24 ENCOUNTER — APPOINTMENT (OUTPATIENT)
Dept: ELECTROPHYSIOLOGY | Facility: CLINIC | Age: 65
End: 2020-09-24
Payer: COMMERCIAL

## 2020-09-24 PROCEDURE — G2066: CPT

## 2020-09-24 PROCEDURE — 93298 REM INTERROG DEV EVAL SCRMS: CPT

## 2020-10-29 ENCOUNTER — APPOINTMENT (OUTPATIENT)
Dept: ELECTROPHYSIOLOGY | Facility: CLINIC | Age: 65
End: 2020-10-29
Payer: COMMERCIAL

## 2020-10-29 PROCEDURE — G2066: CPT

## 2020-10-29 PROCEDURE — 93298 REM INTERROG DEV EVAL SCRMS: CPT

## 2020-11-11 ENCOUNTER — APPOINTMENT (OUTPATIENT)
Dept: ELECTROPHYSIOLOGY | Facility: CLINIC | Age: 65
End: 2020-11-11

## 2020-11-16 ENCOUNTER — RX RENEWAL (OUTPATIENT)
Age: 65
End: 2020-11-16

## 2020-12-03 ENCOUNTER — APPOINTMENT (OUTPATIENT)
Dept: ELECTROPHYSIOLOGY | Facility: CLINIC | Age: 65
End: 2020-12-03
Payer: MEDICARE

## 2020-12-03 PROCEDURE — 93298 REM INTERROG DEV EVAL SCRMS: CPT

## 2020-12-03 PROCEDURE — G2066: CPT

## 2021-01-07 ENCOUNTER — APPOINTMENT (OUTPATIENT)
Dept: ELECTROPHYSIOLOGY | Facility: CLINIC | Age: 66
End: 2021-01-07
Payer: MEDICARE

## 2021-01-07 PROCEDURE — G2066: CPT

## 2021-01-07 PROCEDURE — 93298 REM INTERROG DEV EVAL SCRMS: CPT

## 2021-02-11 ENCOUNTER — NON-APPOINTMENT (OUTPATIENT)
Age: 66
End: 2021-02-11

## 2021-02-11 ENCOUNTER — APPOINTMENT (OUTPATIENT)
Dept: ELECTROPHYSIOLOGY | Facility: CLINIC | Age: 66
End: 2021-02-11
Payer: MEDICARE

## 2021-02-11 PROCEDURE — 93298 REM INTERROG DEV EVAL SCRMS: CPT

## 2021-02-11 PROCEDURE — G2066: CPT

## 2021-03-17 ENCOUNTER — APPOINTMENT (OUTPATIENT)
Dept: CARDIOLOGY | Facility: CLINIC | Age: 66
End: 2021-03-17
Payer: MEDICARE

## 2021-03-17 ENCOUNTER — NON-APPOINTMENT (OUTPATIENT)
Age: 66
End: 2021-03-17

## 2021-03-17 VITALS
DIASTOLIC BLOOD PRESSURE: 61 MMHG | WEIGHT: 164 LBS | BODY MASS INDEX: 24.22 KG/M2 | HEART RATE: 101 BPM | OXYGEN SATURATION: 96 % | SYSTOLIC BLOOD PRESSURE: 98 MMHG

## 2021-03-17 PROCEDURE — 99401 PREV MED CNSL INDIV APPRX 15: CPT | Mod: NC

## 2021-03-17 PROCEDURE — 99215 OFFICE O/P EST HI 40 MIN: CPT

## 2021-03-17 PROCEDURE — 93000 ELECTROCARDIOGRAM COMPLETE: CPT

## 2021-03-18 ENCOUNTER — APPOINTMENT (OUTPATIENT)
Dept: ELECTROPHYSIOLOGY | Facility: CLINIC | Age: 66
End: 2021-03-18
Payer: MEDICARE

## 2021-03-18 ENCOUNTER — NON-APPOINTMENT (OUTPATIENT)
Age: 66
End: 2021-03-18

## 2021-03-18 PROCEDURE — G2066: CPT

## 2021-03-18 PROCEDURE — 93298 REM INTERROG DEV EVAL SCRMS: CPT

## 2021-04-06 ENCOUNTER — RX RENEWAL (OUTPATIENT)
Age: 66
End: 2021-04-06

## 2021-04-22 ENCOUNTER — NON-APPOINTMENT (OUTPATIENT)
Age: 66
End: 2021-04-22

## 2021-04-22 ENCOUNTER — APPOINTMENT (OUTPATIENT)
Dept: ELECTROPHYSIOLOGY | Facility: CLINIC | Age: 66
End: 2021-04-22
Payer: MEDICARE

## 2021-04-22 PROCEDURE — G2066: CPT

## 2021-04-22 PROCEDURE — 93298 REM INTERROG DEV EVAL SCRMS: CPT

## 2021-05-27 ENCOUNTER — NON-APPOINTMENT (OUTPATIENT)
Age: 66
End: 2021-05-27

## 2021-05-27 ENCOUNTER — APPOINTMENT (OUTPATIENT)
Dept: ELECTROPHYSIOLOGY | Facility: CLINIC | Age: 66
End: 2021-05-27
Payer: MEDICARE

## 2021-05-27 PROCEDURE — 93298 REM INTERROG DEV EVAL SCRMS: CPT

## 2021-05-27 PROCEDURE — G2066: CPT

## 2021-07-01 ENCOUNTER — NON-APPOINTMENT (OUTPATIENT)
Age: 66
End: 2021-07-01

## 2021-07-01 ENCOUNTER — APPOINTMENT (OUTPATIENT)
Dept: ELECTROPHYSIOLOGY | Facility: CLINIC | Age: 66
End: 2021-07-01
Payer: MEDICARE

## 2021-07-01 PROCEDURE — 93298 REM INTERROG DEV EVAL SCRMS: CPT

## 2021-07-01 PROCEDURE — G2066: CPT

## 2021-08-06 ENCOUNTER — APPOINTMENT (OUTPATIENT)
Dept: ELECTROPHYSIOLOGY | Facility: CLINIC | Age: 66
End: 2021-08-06
Payer: MEDICARE

## 2021-08-06 ENCOUNTER — NON-APPOINTMENT (OUTPATIENT)
Age: 66
End: 2021-08-06

## 2021-08-06 PROCEDURE — 93298 REM INTERROG DEV EVAL SCRMS: CPT

## 2021-08-06 PROCEDURE — G2066: CPT

## 2021-09-09 ENCOUNTER — NON-APPOINTMENT (OUTPATIENT)
Age: 66
End: 2021-09-09

## 2021-09-09 ENCOUNTER — APPOINTMENT (OUTPATIENT)
Dept: ELECTROPHYSIOLOGY | Facility: CLINIC | Age: 66
End: 2021-09-09
Payer: MEDICARE

## 2021-09-09 PROCEDURE — 93298 REM INTERROG DEV EVAL SCRMS: CPT

## 2021-09-09 PROCEDURE — G2066: CPT

## 2021-09-22 ENCOUNTER — APPOINTMENT (OUTPATIENT)
Dept: CARDIOLOGY | Facility: CLINIC | Age: 66
End: 2021-09-22
Payer: MEDICARE

## 2021-09-22 ENCOUNTER — NON-APPOINTMENT (OUTPATIENT)
Age: 66
End: 2021-09-22

## 2021-09-22 VITALS
DIASTOLIC BLOOD PRESSURE: 68 MMHG | SYSTOLIC BLOOD PRESSURE: 101 MMHG | HEART RATE: 63 BPM | HEIGHT: 69 IN | OXYGEN SATURATION: 98 % | BODY MASS INDEX: 24.44 KG/M2 | WEIGHT: 165 LBS

## 2021-09-22 PROCEDURE — 99214 OFFICE O/P EST MOD 30 MIN: CPT

## 2021-09-22 PROCEDURE — 93000 ELECTROCARDIOGRAM COMPLETE: CPT

## 2021-10-15 ENCOUNTER — NON-APPOINTMENT (OUTPATIENT)
Age: 66
End: 2021-10-15

## 2021-10-15 ENCOUNTER — APPOINTMENT (OUTPATIENT)
Dept: ELECTROPHYSIOLOGY | Facility: CLINIC | Age: 66
End: 2021-10-15
Payer: MEDICARE

## 2021-10-15 PROCEDURE — G2066: CPT | Mod: NC

## 2021-10-15 PROCEDURE — 93298 REM INTERROG DEV EVAL SCRMS: CPT

## 2021-10-20 DIAGNOSIS — Z00.00 ENCOUNTER FOR GENERAL ADULT MEDICAL EXAMINATION W/OUT ABNORMAL FINDINGS: ICD-10-CM

## 2021-10-21 ENCOUNTER — APPOINTMENT (OUTPATIENT)
Dept: ORTHOPEDIC SURGERY | Facility: CLINIC | Age: 66
End: 2021-10-21
Payer: MEDICARE

## 2021-10-21 DIAGNOSIS — M25.551 PAIN IN RIGHT HIP: ICD-10-CM

## 2021-10-21 PROCEDURE — 99203 OFFICE O/P NEW LOW 30 MIN: CPT

## 2021-10-21 PROCEDURE — 73502 X-RAY EXAM HIP UNI 2-3 VIEWS: CPT

## 2021-10-21 NOTE — DISCUSSION/SUMMARY
[de-identified] : This patient has right buttock pain.  The pain appears to be extra-articular but she thinks that it is From her hip joint.  The patient is not an appropriate candidate for surgical intervention at this time. An extensive discussion was conducted on the natural history of the disease and the variety of surgical and non-surgical options available to the patient including, but not limited to non-steroidal anti-inflammatory medications, steroid injections, physical therapy, maintenance of ideal body weight, and reduction of activity.  Recommend an MRI to rule out labral tear.  The patient will be sent for a MRI of the [JOINT]. They will notify me when the MRI is complete and we will arrange for either an in person or telehealth virtual visit to review the results as well as any next steps in the plan.\par \par  within normal limits

## 2021-10-21 NOTE — HISTORY OF PRESENT ILLNESS
[de-identified] : This is very nice 65-year-old female experiencing right posterior hip pain, which is moderate in intensity. The pain mildly limits activities of daily living. Walking tolerance is not reduced.  She states that she has had pain for more than 1 year.  Oral NSAIDs do help but she does not take these every day.  She complains of pain in the groin.  She has tried TENS unit and acupuncture.  Walking in the sand makes the pain worse.  Does not use a cane or walker.  She does not do formal physical therapy.  She has not tried a right hip intra-articular cortisone injection. The patient denies any radiation of the pain to the feet and it is not associated with numbness, tingling, or weakness.

## 2021-10-21 NOTE — PHYSICAL EXAM
[de-identified] : Patient is well nourished, well-developed, in no acute distress, with appropriate mood and affect. The patient is oriented to time, place, and person. Respirations are even and unlabored. Gait evaluation does not reveal a limp. There is no inguinal adenopathy. Examination of the contralateral hip shows normal range of motion, strength, no tenderness, and intact skin. The affected limb is well-perfused and showed 2+ dp/pt pulses, without skin lesions, shows a grossly normal motor and sensory examination. Examination of the hip shows no skin lesions. Hip motion is full and painless from 0-90 degrees extension to flexion, 20 degrees adduction and 20 degrees abduction, and 15 degrees internal and 30 degrees external rotation. Leg lengths are approximately equal. FADIR is negative and LESLIE is negative. Stinchfield test is negative. Both hips are stable and muscle strength is normal with good strength with resisted abduction and adduction. Pedal pulses are palpable. [de-identified] : AP and lateral x-rays of the right hip, pelvis, and femur were ordered and taken in the office and demonstrate no evidence of degenerative joint disease of the hip with maintained joint space and no evidence of fractures or other intraarticular pathology.

## 2021-10-26 ENCOUNTER — APPOINTMENT (OUTPATIENT)
Dept: MRI IMAGING | Facility: CLINIC | Age: 66
End: 2021-10-26
Payer: MEDICARE

## 2021-10-26 PROCEDURE — 73721 MRI JNT OF LWR EXTRE W/O DYE: CPT | Mod: RT,ME

## 2021-10-26 PROCEDURE — G1004: CPT

## 2021-10-29 ENCOUNTER — NON-APPOINTMENT (OUTPATIENT)
Age: 66
End: 2021-10-29

## 2021-11-10 ENCOUNTER — RESULT REVIEW (OUTPATIENT)
Age: 66
End: 2021-11-10

## 2021-11-10 ENCOUNTER — OUTPATIENT (OUTPATIENT)
Dept: OUTPATIENT SERVICES | Facility: HOSPITAL | Age: 66
LOS: 1 days | End: 2021-11-10
Payer: MEDICARE

## 2021-11-10 ENCOUNTER — APPOINTMENT (OUTPATIENT)
Dept: RADIOLOGY | Facility: CLINIC | Age: 66
End: 2021-11-10
Payer: MEDICARE

## 2021-11-10 DIAGNOSIS — Z98.890 OTHER SPECIFIED POSTPROCEDURAL STATES: Chronic | ICD-10-CM

## 2021-11-10 DIAGNOSIS — M25.551 PAIN IN RIGHT HIP: ICD-10-CM

## 2021-11-10 PROCEDURE — 27093 INJECTION FOR HIP X-RAY: CPT

## 2021-11-10 PROCEDURE — 73525 CONTRAST X-RAY OF HIP: CPT

## 2021-11-10 PROCEDURE — 27093 INJECTION FOR HIP X-RAY: CPT | Mod: RT

## 2021-11-10 PROCEDURE — 73525 CONTRAST X-RAY OF HIP: CPT | Mod: 26,RT

## 2021-11-18 ENCOUNTER — APPOINTMENT (OUTPATIENT)
Dept: ELECTROPHYSIOLOGY | Facility: CLINIC | Age: 66
End: 2021-11-18
Payer: MEDICARE

## 2021-11-18 ENCOUNTER — NON-APPOINTMENT (OUTPATIENT)
Age: 66
End: 2021-11-18

## 2021-11-18 PROCEDURE — 93298 REM INTERROG DEV EVAL SCRMS: CPT

## 2021-11-18 PROCEDURE — G2066: CPT | Mod: NC

## 2021-12-06 ENCOUNTER — RX RENEWAL (OUTPATIENT)
Age: 66
End: 2021-12-06

## 2021-12-29 ENCOUNTER — NON-APPOINTMENT (OUTPATIENT)
Age: 66
End: 2021-12-29

## 2021-12-29 ENCOUNTER — APPOINTMENT (OUTPATIENT)
Dept: ELECTROPHYSIOLOGY | Facility: CLINIC | Age: 66
End: 2021-12-29
Payer: MEDICARE

## 2021-12-29 PROCEDURE — G2066: CPT

## 2021-12-29 PROCEDURE — 93298 REM INTERROG DEV EVAL SCRMS: CPT

## 2022-02-02 ENCOUNTER — NON-APPOINTMENT (OUTPATIENT)
Age: 67
End: 2022-02-02

## 2022-02-02 ENCOUNTER — APPOINTMENT (OUTPATIENT)
Dept: ELECTROPHYSIOLOGY | Facility: CLINIC | Age: 67
End: 2022-02-02
Payer: MEDICARE

## 2022-02-02 PROCEDURE — 93298 REM INTERROG DEV EVAL SCRMS: CPT

## 2022-02-02 PROCEDURE — G2066: CPT

## 2022-03-09 ENCOUNTER — APPOINTMENT (OUTPATIENT)
Dept: ELECTROPHYSIOLOGY | Facility: CLINIC | Age: 67
End: 2022-03-09
Payer: MEDICARE

## 2022-03-09 ENCOUNTER — NON-APPOINTMENT (OUTPATIENT)
Age: 67
End: 2022-03-09

## 2022-03-09 PROCEDURE — 93298 REM INTERROG DEV EVAL SCRMS: CPT

## 2022-03-09 PROCEDURE — G2066: CPT

## 2022-03-23 ENCOUNTER — NON-APPOINTMENT (OUTPATIENT)
Age: 67
End: 2022-03-23

## 2022-03-23 ENCOUNTER — APPOINTMENT (OUTPATIENT)
Dept: CARDIOLOGY | Facility: CLINIC | Age: 67
End: 2022-03-23
Payer: MEDICARE

## 2022-03-23 VITALS
OXYGEN SATURATION: 98 % | WEIGHT: 165 LBS | SYSTOLIC BLOOD PRESSURE: 118 MMHG | BODY MASS INDEX: 24.44 KG/M2 | DIASTOLIC BLOOD PRESSURE: 76 MMHG | HEIGHT: 69 IN | HEART RATE: 62 BPM

## 2022-03-23 PROCEDURE — 93000 ELECTROCARDIOGRAM COMPLETE: CPT

## 2022-03-23 PROCEDURE — 99215 OFFICE O/P EST HI 40 MIN: CPT | Mod: 25

## 2022-03-24 LAB
ALBUMIN SERPL ELPH-MCNC: 4.6 G/DL
ALP BLD-CCNC: 97 U/L
ALT SERPL-CCNC: 10 U/L
ANION GAP SERPL CALC-SCNC: 12 MMOL/L
AST SERPL-CCNC: 13 U/L
BASOPHILS # BLD AUTO: 0.05 K/UL
BASOPHILS NFR BLD AUTO: 0.7 %
BILIRUB SERPL-MCNC: 0.8 MG/DL
BUN SERPL-MCNC: 15 MG/DL
CALCIUM SERPL-MCNC: 9.6 MG/DL
CHLORIDE SERPL-SCNC: 105 MMOL/L
CHOLEST SERPL-MCNC: 173 MG/DL
CO2 SERPL-SCNC: 24 MMOL/L
CREAT SERPL-MCNC: 1.01 MG/DL
EGFR: 61 ML/MIN/1.73M2
EOSINOPHIL # BLD AUTO: 0.19 K/UL
EOSINOPHIL NFR BLD AUTO: 2.7 %
ESTIMATED AVERAGE GLUCOSE: 105 MG/DL
GLUCOSE SERPL-MCNC: 95 MG/DL
HBA1C MFR BLD HPLC: 5.3 %
HCT VFR BLD CALC: 39.8 %
HDLC SERPL-MCNC: 67 MG/DL
HGB BLD-MCNC: 13.2 G/DL
IMM GRANULOCYTES NFR BLD AUTO: 0.4 %
LDLC SERPL CALC-MCNC: 94 MG/DL
LYMPHOCYTES # BLD AUTO: 1.51 K/UL
LYMPHOCYTES NFR BLD AUTO: 21.2 %
MAN DIFF?: NORMAL
MCHC RBC-ENTMCNC: 31 PG
MCHC RBC-ENTMCNC: 33.2 GM/DL
MCV RBC AUTO: 93.4 FL
MONOCYTES # BLD AUTO: 0.43 K/UL
MONOCYTES NFR BLD AUTO: 6 %
NEUTROPHILS # BLD AUTO: 4.9 K/UL
NEUTROPHILS NFR BLD AUTO: 69 %
NONHDLC SERPL-MCNC: 107 MG/DL
PLATELET # BLD AUTO: 347 K/UL
POTASSIUM SERPL-SCNC: 4.7 MMOL/L
PROT SERPL-MCNC: 6.9 G/DL
RBC # BLD: 4.26 M/UL
RBC # FLD: 13.4 %
SODIUM SERPL-SCNC: 141 MMOL/L
TRIGL SERPL-MCNC: 66 MG/DL
TSH SERPL-ACNC: 2.8 UIU/ML
WBC # FLD AUTO: 7.11 K/UL

## 2022-03-29 ENCOUNTER — APPOINTMENT (OUTPATIENT)
Dept: ORTHOPEDIC SURGERY | Facility: CLINIC | Age: 67
End: 2022-03-29
Payer: MEDICARE

## 2022-03-29 ENCOUNTER — APPOINTMENT (OUTPATIENT)
Dept: PAIN MANAGEMENT | Facility: CLINIC | Age: 67
End: 2022-03-29
Payer: MEDICARE

## 2022-03-29 VITALS
BODY MASS INDEX: 24.44 KG/M2 | SYSTOLIC BLOOD PRESSURE: 115 MMHG | DIASTOLIC BLOOD PRESSURE: 68 MMHG | TEMPERATURE: 98.6 F | OXYGEN SATURATION: 98 % | HEART RATE: 62 BPM | WEIGHT: 165 LBS | HEIGHT: 69 IN

## 2022-03-29 VITALS — HEIGHT: 69 IN | WEIGHT: 165 LBS | BODY MASS INDEX: 24.44 KG/M2

## 2022-03-29 DIAGNOSIS — G89.29 LOW BACK PAIN, UNSPECIFIED: ICD-10-CM

## 2022-03-29 DIAGNOSIS — M54.50 LOW BACK PAIN, UNSPECIFIED: ICD-10-CM

## 2022-03-29 PROCEDURE — 99214 OFFICE O/P EST MOD 30 MIN: CPT

## 2022-03-29 NOTE — HISTORY OF PRESENT ILLNESS
[FreeTextEntry1] : 67 y/o F Pmhx Depression, chronic Migraines, glaucoma, Afib  s/p ablation March 2019, chronic Migraines who was last seen in April 2019 is here for follow up.  She is having headaches once every 1-2 weeks described as a pounding pain that she wakes up with in the morning, no sens to light or noise, no nausea, vomiting.  Triggers include red wine, aged cheese, citrus and salty foods. She takes sumatriptan 50 mg which helps. \par \par Current meds include: Propranolol, flecainide 50 mg Bid, sumatriptan prn

## 2022-03-29 NOTE — PHYSICAL EXAM
[General Appearance - Alert] : alert [Oriented To Time, Place, And Person] : oriented to person, place, and time [Affect] : the affect was normal [Mood] : the mood was normal [Cranial Nerves Optic (II)] : visual acuity intact bilaterally,  visual fields full to confrontation, pupils equal round and reactive to light [Cranial Nerves Oculomotor (III)] : extraocular motion intact [Cranial Nerves Trigeminal (V)] : facial sensation intact symmetrically [Cranial Nerves Facial (VII)] : face symmetrical [Cranial Nerves Vestibulocochlear (VIII)] : hearing was intact bilaterally [Cranial Nerves Glossopharyngeal (IX)] : tongue and palate midline [Cranial Nerves Accessory (XI - Cranial And Spinal)] : head turning and shoulder shrug symmetric [Cranial Nerves Hypoglossal (XII)] : there was no tongue deviation with protrusion [Sensation Tactile Decrease] : light touch was intact [Sclera] : the sclera and conjunctiva were normal [Neck Appearance] : the appearance of the neck was normal [No CVA Tenderness] : no ~M costovertebral angle tenderness [Abnormal Walk] : normal gait [Motor Strength Upper Extremities Bilaterally] : strength was normal in both upper extremities [Motor Strength Lower Extremities Bilaterally] : strength was normal in both lower extremities

## 2022-03-29 NOTE — DISCUSSION/SUMMARY
[de-identified] : This patient has right buttock pain.  The pain appears to be extra-articular and not from hip joint.  This is confirmed based on the fact that the injection did not help her at all.  The patient is not an appropriate candidate for surgical intervention at this time. An extensive discussion was conducted on the natural history of the disease and the variety of surgical and non-surgical options available to the patient including, but not limited to non-steroidal anti-inflammatory medications, steroid injections, physical therapy, maintenance of ideal body weight, and reduction of activity.  The patient will be sent for a MRI of the lumbar spine. They will notify me when the MRI is complete and we will arrange for either an in person or telehealth virtual visit to review the results as well as any next steps in the plan.  Physical therapy prescribed as well for core strengthening.  She does not want prescription NSAIDs.\par

## 2022-03-29 NOTE — ASSESSMENT
[FreeTextEntry1] : 67 y/o F with Afib s/p ablation on Propranolol, flecainide who is following up for chronic Migraines. \par -New onset Right lower back pain -\par  seeing Dr. Garcia MRI L spine planned and starting PT . \par \par - Sumatriptan prn 1-4x/month on average, refill sent but would like clearance from Cardio Dr. Reveles to continue. We have discussed possibly switching to newer agents such as Ubrelvy or Nurtec but will hold off at this time as she is reluctant since the sumatriptan has been helpful. \par fu in 6-12 months or sooner if needed. \par \par I am seeing EFRA GUTIERREZ as incident to service. Dr. Bolton is present in the office suite immediately available and able to provide assistance and direction throughout the time the service was performed.\par \par

## 2022-03-29 NOTE — PHYSICAL EXAM
[de-identified] : Patient is well nourished, well-developed, in no acute distress, with appropriate mood and affect. The patient is oriented to time, place, and person. Respirations are even and unlabored. Gait evaluation does not reveal a limp. There is no inguinal adenopathy. Examination of the contralateral hip shows normal range of motion, strength, no tenderness, and intact skin. The affected limb is well-perfused and showed 2+ dp/pt pulses, without skin lesions, shows a grossly normal motor and sensory examination. Examination of the hip shows no skin lesions. Hip motion is full and painless from 0-90 degrees extension to flexion, 20 degrees adduction and 20 degrees abduction, and 15 degrees internal and 30 degrees external rotation. Leg lengths are approximately equal. FADIR is negative and LESLIE is negative. Stinchfield test is negative. Both hips are stable and muscle strength is normal with good strength with resisted abduction and adduction. Pedal pulses are palpable.

## 2022-03-29 NOTE — HISTORY OF PRESENT ILLNESS
[de-identified] : This is very nice 66-year-old female experiencing right posterior hip pain, which is moderate in intensity.  I previously seen her for this before and I did not feel like the pain was coming from her hip joint and it was felt to be extra-articular.  The pain mildly limits activities of daily living. Walking tolerance is not reduced.  She states that she has had pain for more than 2 years.  Oral NSAIDs do help but she does not take these every day.  She complains of pain in the groin.  She has tried TENS unit and acupuncture.  Walking in the sand makes the pain worse.  Does not use a cane or walker.  She does not do formal physical therapy.  A right hip intra-articular cortisone injection helped less than 10% for less than 1 week.. The patient denies any radiation of the pain to the feet and it is not associated with numbness, tingling, or weakness.  Bending forward helps it feel better.

## 2022-04-12 ENCOUNTER — OUTPATIENT (OUTPATIENT)
Dept: OUTPATIENT SERVICES | Facility: HOSPITAL | Age: 67
LOS: 1 days | End: 2022-04-12
Payer: MEDICARE

## 2022-04-12 ENCOUNTER — RESULT REVIEW (OUTPATIENT)
Age: 67
End: 2022-04-12

## 2022-04-12 ENCOUNTER — APPOINTMENT (OUTPATIENT)
Dept: MRI IMAGING | Facility: CLINIC | Age: 67
End: 2022-04-12
Payer: MEDICARE

## 2022-04-12 DIAGNOSIS — Z00.8 ENCOUNTER FOR OTHER GENERAL EXAMINATION: ICD-10-CM

## 2022-04-12 DIAGNOSIS — Z98.890 OTHER SPECIFIED POSTPROCEDURAL STATES: Chronic | ICD-10-CM

## 2022-04-12 PROCEDURE — 72148 MRI LUMBAR SPINE W/O DYE: CPT

## 2022-04-12 PROCEDURE — 72148 MRI LUMBAR SPINE W/O DYE: CPT | Mod: 26,MH

## 2022-04-13 ENCOUNTER — APPOINTMENT (OUTPATIENT)
Dept: ELECTROPHYSIOLOGY | Facility: CLINIC | Age: 67
End: 2022-04-13
Payer: MEDICARE

## 2022-04-13 ENCOUNTER — NON-APPOINTMENT (OUTPATIENT)
Age: 67
End: 2022-04-13

## 2022-04-13 PROCEDURE — G2066: CPT

## 2022-04-13 PROCEDURE — 93298 REM INTERROG DEV EVAL SCRMS: CPT

## 2022-04-19 ENCOUNTER — NON-APPOINTMENT (OUTPATIENT)
Age: 67
End: 2022-04-19

## 2022-04-23 ENCOUNTER — TRANSCRIPTION ENCOUNTER (OUTPATIENT)
Age: 67
End: 2022-04-23

## 2022-05-16 ENCOUNTER — RESULT CHARGE (OUTPATIENT)
Age: 67
End: 2022-05-16

## 2022-05-17 ENCOUNTER — NON-APPOINTMENT (OUTPATIENT)
Age: 67
End: 2022-05-17

## 2022-05-17 ENCOUNTER — APPOINTMENT (OUTPATIENT)
Dept: CARDIOLOGY | Facility: CLINIC | Age: 67
End: 2022-05-17
Payer: MEDICARE

## 2022-05-17 VITALS
HEIGHT: 69 IN | DIASTOLIC BLOOD PRESSURE: 80 MMHG | OXYGEN SATURATION: 100 % | WEIGHT: 162 LBS | BODY MASS INDEX: 23.99 KG/M2 | SYSTOLIC BLOOD PRESSURE: 123 MMHG | HEART RATE: 68 BPM

## 2022-05-17 PROCEDURE — 93000 ELECTROCARDIOGRAM COMPLETE: CPT

## 2022-05-17 PROCEDURE — 99215 OFFICE O/P EST HI 40 MIN: CPT

## 2022-05-18 ENCOUNTER — APPOINTMENT (OUTPATIENT)
Dept: ELECTROPHYSIOLOGY | Facility: CLINIC | Age: 67
End: 2022-05-18
Payer: MEDICARE

## 2022-05-18 ENCOUNTER — NON-APPOINTMENT (OUTPATIENT)
Age: 67
End: 2022-05-18

## 2022-05-18 PROCEDURE — 93298 REM INTERROG DEV EVAL SCRMS: CPT

## 2022-05-18 PROCEDURE — G2066: CPT

## 2022-05-20 ENCOUNTER — TRANSCRIPTION ENCOUNTER (OUTPATIENT)
Age: 67
End: 2022-05-20

## 2022-06-10 ENCOUNTER — RX RENEWAL (OUTPATIENT)
Age: 67
End: 2022-06-10

## 2022-06-23 ENCOUNTER — APPOINTMENT (OUTPATIENT)
Dept: ELECTROPHYSIOLOGY | Facility: CLINIC | Age: 67
End: 2022-06-23
Payer: MEDICARE

## 2022-06-23 ENCOUNTER — NON-APPOINTMENT (OUTPATIENT)
Age: 67
End: 2022-06-23

## 2022-06-23 PROCEDURE — 93298 REM INTERROG DEV EVAL SCRMS: CPT

## 2022-06-23 PROCEDURE — G2066: CPT

## 2022-07-28 ENCOUNTER — APPOINTMENT (OUTPATIENT)
Dept: ELECTROPHYSIOLOGY | Facility: CLINIC | Age: 67
End: 2022-07-28

## 2022-07-28 ENCOUNTER — NON-APPOINTMENT (OUTPATIENT)
Age: 67
End: 2022-07-28

## 2022-07-28 PROCEDURE — G2066: CPT

## 2022-07-28 PROCEDURE — 93298 REM INTERROG DEV EVAL SCRMS: CPT

## 2022-08-10 ENCOUNTER — APPOINTMENT (OUTPATIENT)
Dept: CARDIOLOGY | Facility: CLINIC | Age: 67
End: 2022-08-10

## 2022-08-10 ENCOUNTER — NON-APPOINTMENT (OUTPATIENT)
Age: 67
End: 2022-08-10

## 2022-08-10 VITALS
DIASTOLIC BLOOD PRESSURE: 76 MMHG | HEART RATE: 59 BPM | OXYGEN SATURATION: 98 % | WEIGHT: 165 LBS | HEIGHT: 69 IN | BODY MASS INDEX: 24.44 KG/M2 | SYSTOLIC BLOOD PRESSURE: 112 MMHG

## 2022-08-10 PROCEDURE — 93000 ELECTROCARDIOGRAM COMPLETE: CPT

## 2022-08-10 PROCEDURE — 99215 OFFICE O/P EST HI 40 MIN: CPT

## 2022-08-29 NOTE — CONSULT LETTER
[Dear  ___] : Dear  [unfilled], [Consult Letter:] : I had the pleasure of evaluating your patient, [unfilled]. [Please see my note below.] : Please see my note below. [Consult Closing:] : Thank you very much for allowing me to participate in the care of this patient.  If you have any questions, please do not hesitate to contact me. [Sincerely,] : Sincerely, [DrLuis  ___] : Dr. PIÑA [FreeTextEntry1] : Currently Sumatriptan is used to help control her migraines. Today she stated that her cardiologist and electrophysiologist are aware that this medication is being used. I am seeking confirmation that you are in fact aware of its use.  [FreeTextEntry3] : Dr. Liban Bolton  29-Aug-2022

## 2022-09-01 ENCOUNTER — APPOINTMENT (OUTPATIENT)
Dept: ELECTROPHYSIOLOGY | Facility: CLINIC | Age: 67
End: 2022-09-01

## 2022-09-01 ENCOUNTER — NON-APPOINTMENT (OUTPATIENT)
Age: 67
End: 2022-09-01

## 2022-09-01 PROCEDURE — 93298 REM INTERROG DEV EVAL SCRMS: CPT

## 2022-09-01 PROCEDURE — G2066: CPT

## 2022-09-28 ENCOUNTER — APPOINTMENT (OUTPATIENT)
Dept: CARDIOLOGY | Facility: CLINIC | Age: 67
End: 2022-09-28

## 2022-10-06 ENCOUNTER — NON-APPOINTMENT (OUTPATIENT)
Age: 67
End: 2022-10-06

## 2022-10-06 ENCOUNTER — APPOINTMENT (OUTPATIENT)
Dept: ELECTROPHYSIOLOGY | Facility: CLINIC | Age: 67
End: 2022-10-06

## 2022-10-06 PROCEDURE — 93298 REM INTERROG DEV EVAL SCRMS: CPT

## 2022-10-06 PROCEDURE — G2066: CPT

## 2022-11-09 ENCOUNTER — APPOINTMENT (OUTPATIENT)
Dept: ELECTROPHYSIOLOGY | Facility: CLINIC | Age: 67
End: 2022-11-09

## 2022-11-09 ENCOUNTER — NON-APPOINTMENT (OUTPATIENT)
Age: 67
End: 2022-11-09

## 2022-11-09 PROCEDURE — G2066: CPT

## 2022-11-09 PROCEDURE — 93298 REM INTERROG DEV EVAL SCRMS: CPT

## 2022-11-10 ENCOUNTER — APPOINTMENT (OUTPATIENT)
Dept: ELECTROPHYSIOLOGY | Facility: CLINIC | Age: 67
End: 2022-11-10

## 2022-12-14 ENCOUNTER — APPOINTMENT (OUTPATIENT)
Dept: ELECTROPHYSIOLOGY | Facility: CLINIC | Age: 67
End: 2022-12-14

## 2022-12-14 ENCOUNTER — NON-APPOINTMENT (OUTPATIENT)
Age: 67
End: 2022-12-14

## 2022-12-14 PROCEDURE — 93298 REM INTERROG DEV EVAL SCRMS: CPT

## 2022-12-14 PROCEDURE — G2066: CPT

## 2022-12-20 ENCOUNTER — NON-APPOINTMENT (OUTPATIENT)
Age: 67
End: 2022-12-20

## 2022-12-22 ENCOUNTER — RESULT CHARGE (OUTPATIENT)
Age: 67
End: 2022-12-22

## 2022-12-23 ENCOUNTER — APPOINTMENT (OUTPATIENT)
Dept: ELECTROPHYSIOLOGY | Facility: CLINIC | Age: 67
End: 2022-12-23

## 2022-12-23 ENCOUNTER — NON-APPOINTMENT (OUTPATIENT)
Age: 67
End: 2022-12-23

## 2022-12-23 VITALS — HEART RATE: 68 BPM | DIASTOLIC BLOOD PRESSURE: 67 MMHG | SYSTOLIC BLOOD PRESSURE: 99 MMHG

## 2022-12-23 PROCEDURE — 93291 INTERROG DEV EVAL SCRMS IP: CPT

## 2022-12-23 PROCEDURE — 93000 ELECTROCARDIOGRAM COMPLETE: CPT | Mod: 59

## 2023-01-03 ENCOUNTER — RX RENEWAL (OUTPATIENT)
Age: 68
End: 2023-01-03

## 2023-01-08 ENCOUNTER — NON-APPOINTMENT (OUTPATIENT)
Age: 68
End: 2023-01-08

## 2023-01-10 ENCOUNTER — NON-APPOINTMENT (OUTPATIENT)
Age: 68
End: 2023-01-10

## 2023-01-13 ENCOUNTER — TRANSCRIPTION ENCOUNTER (OUTPATIENT)
Age: 68
End: 2023-01-13

## 2023-01-13 ENCOUNTER — OUTPATIENT (OUTPATIENT)
Dept: OUTPATIENT SERVICES | Facility: HOSPITAL | Age: 68
LOS: 1 days | End: 2023-01-13
Payer: MEDICARE

## 2023-01-13 VITALS
SYSTOLIC BLOOD PRESSURE: 108 MMHG | WEIGHT: 164.91 LBS | OXYGEN SATURATION: 98 % | HEIGHT: 68 IN | RESPIRATION RATE: 18 BRPM | DIASTOLIC BLOOD PRESSURE: 55 MMHG | HEART RATE: 64 BPM | TEMPERATURE: 98 F

## 2023-01-13 VITALS
OXYGEN SATURATION: 99 % | SYSTOLIC BLOOD PRESSURE: 102 MMHG | DIASTOLIC BLOOD PRESSURE: 53 MMHG | HEART RATE: 59 BPM | RESPIRATION RATE: 18 BRPM

## 2023-01-13 DIAGNOSIS — Z98.890 OTHER SPECIFIED POSTPROCEDURAL STATES: Chronic | ICD-10-CM

## 2023-01-13 DIAGNOSIS — I48.0 PAROXYSMAL ATRIAL FIBRILLATION: ICD-10-CM

## 2023-01-13 PROCEDURE — 99223 1ST HOSP IP/OBS HIGH 75: CPT | Mod: 25

## 2023-01-13 PROCEDURE — 33285 INSJ SUBQ CAR RHYTHM MNTR: CPT

## 2023-01-13 PROCEDURE — 33286 RMVL SUBQ CAR RHYTHM MNTR: CPT | Mod: 59

## 2023-01-13 RX ORDER — CLONAZEPAM 1 MG
1 TABLET ORAL
Qty: 0 | Refills: 0 | DISCHARGE

## 2023-01-13 RX ORDER — SUMATRIPTAN SUCCINATE 4 MG/.5ML
1 INJECTION, SOLUTION SUBCUTANEOUS
Qty: 0 | Refills: 0 | DISCHARGE

## 2023-01-13 RX ORDER — FLECAINIDE ACETATE 50 MG
1 TABLET ORAL
Qty: 0 | Refills: 3 | DISCHARGE

## 2023-01-13 RX ORDER — FLUOXETINE HCL 10 MG
50 CAPSULE ORAL
Qty: 0 | Refills: 0 | DISCHARGE

## 2023-01-13 RX ORDER — BIMATOPROST 0.3 MG/ML
1 SOLUTION/ DROPS OPHTHALMIC
Qty: 0 | Refills: 0 | DISCHARGE

## 2023-01-13 RX ORDER — APIXABAN 2.5 MG/1
1 TABLET, FILM COATED ORAL
Qty: 0 | Refills: 0 | DISCHARGE

## 2023-01-13 RX ORDER — RIZATRIPTAN BENZOATE 5 MG/1
1 TABLET ORAL
Qty: 0 | Refills: 0 | DISCHARGE

## 2023-01-13 RX ORDER — BRINZOLAMIDE/BRIMONIDINE TARTRATE 10; 2 MG/ML; MG/ML
1 SUSPENSION/ DROPS OPHTHALMIC
Qty: 0 | Refills: 0 | DISCHARGE

## 2023-01-13 RX ORDER — FLECAINIDE ACETATE 50 MG
0.5 TABLET ORAL
Qty: 0 | Refills: 0 | DISCHARGE

## 2023-01-13 NOTE — ASU DISCHARGE PLAN (ADULT/PEDIATRIC) - PROVIDER TOKENS
PROVIDER:[TOKEN:[[id number]],SCHEDULEDAPPT:[01/23/2023],SCHEDULEDAPPTTIME:[10:40 AM],ESTABLISHEDPATIENT:[T]]

## 2023-01-13 NOTE — ASU DISCHARGE PLAN (ADULT/PEDIATRIC) - NS MD DC FALL RISK RISK
For information on Fall & Injury Prevention, visit: [email or web address] OR  [email or web address] OR  https://www.cdc.gov/steadi/patient.html

## 2023-01-13 NOTE — H&P CARDIOLOGY - NSICDXPASTSURGICALHX_GEN_ALL_CORE_FT
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

## 2023-01-13 NOTE — H&P CARDIOLOGY - HISTORY OF PRESENT ILLNESS
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 She discontinued Eliquis given her low burden.  However, ILR remote transmission on May,2022 alerted many episodes of Afib, with longest episode lasting up to 50 mins. Given her [id number] score of 2 (female and age), she is at mild risk for stroke and started on OAC for TE prophylaxis. ILR battery has reached RRT as of 12/4/22. Now presents to Dr. Benavides for ILR explant and reimplant.

## 2023-01-13 NOTE — ASU DISCHARGE PLAN (ADULT/PEDIATRIC) - CARE PROVIDER_API CALL
Mis Benavides)  Cardiac Electrophysiology; Cardiovascular Disease; Internal Medicine  [address] [place] [address]  Phone: [phone]  Fax: [phone]  Established Patient  Scheduled Appointment: 01/23/2023 10:40 AM

## 2023-01-13 NOTE — H&P CARDIOLOGY - NSICDXPASTMEDICALHX_GEN_ALL_CORE_FT
PAST MEDICAL HISTORY:  Atrial fibrillation s/p Ablation    Gastritis     Glaucoma     Heart Murmur Denies syncope, denies dizziness, denies palpitations.  last echo done [id number]    Migraine Headache

## 2023-01-18 ENCOUNTER — APPOINTMENT (OUTPATIENT)
Dept: ELECTROPHYSIOLOGY | Facility: CLINIC | Age: 68
End: 2023-01-18

## 2023-01-23 ENCOUNTER — APPOINTMENT (OUTPATIENT)
Dept: ELECTROPHYSIOLOGY | Facility: CLINIC | Age: 68
End: 2023-01-23
Payer: MEDICARE

## 2023-01-23 ENCOUNTER — NON-APPOINTMENT (OUTPATIENT)
Age: 68
End: 2023-01-23

## 2023-01-23 VITALS
OXYGEN SATURATION: 98 % | DIASTOLIC BLOOD PRESSURE: 66 MMHG | WEIGHT: 165 LBS | HEIGHT: 69 IN | SYSTOLIC BLOOD PRESSURE: 99 MMHG | HEART RATE: 64 BPM | BODY MASS INDEX: 24.44 KG/M2

## 2023-01-23 PROCEDURE — 33286 RMVL SUBQ CAR RHYTHM MNTR: CPT

## 2023-01-23 PROCEDURE — 33285 INSJ SUBQ CAR RHYTHM MNTR: CPT

## 2023-01-23 PROCEDURE — 99213 OFFICE O/P EST LOW 20 MIN: CPT

## 2023-01-23 PROCEDURE — C1764: CPT

## 2023-01-23 PROCEDURE — 93291 INTERROG DEV EVAL SCRMS IP: CPT

## 2023-01-23 PROCEDURE — 93000 ELECTROCARDIOGRAM COMPLETE: CPT | Mod: 59

## 2023-01-23 RX ORDER — APIXABAN 5 MG/1
5 TABLET, FILM COATED ORAL
Qty: 180 | Refills: 3 | Status: DISCONTINUED | COMMUNITY
Start: 2022-05-20 | End: 2023-01-23

## 2023-02-27 ENCOUNTER — APPOINTMENT (OUTPATIENT)
Dept: ELECTROPHYSIOLOGY | Facility: CLINIC | Age: 68
End: 2023-02-27
Payer: MEDICARE

## 2023-02-27 ENCOUNTER — NON-APPOINTMENT (OUTPATIENT)
Age: 68
End: 2023-02-27

## 2023-02-27 PROCEDURE — 93298 REM INTERROG DEV EVAL SCRMS: CPT

## 2023-02-27 PROCEDURE — G2066: CPT

## 2023-03-06 ENCOUNTER — NON-APPOINTMENT (OUTPATIENT)
Age: 68
End: 2023-03-06

## 2023-03-08 ENCOUNTER — APPOINTMENT (OUTPATIENT)
Dept: CARDIOLOGY | Facility: CLINIC | Age: 68
End: 2023-03-08
Payer: MEDICARE

## 2023-03-08 ENCOUNTER — NON-APPOINTMENT (OUTPATIENT)
Age: 68
End: 2023-03-08

## 2023-03-08 VITALS
DIASTOLIC BLOOD PRESSURE: 74 MMHG | HEART RATE: 73 BPM | BODY MASS INDEX: 24.44 KG/M2 | OXYGEN SATURATION: 97 % | SYSTOLIC BLOOD PRESSURE: 110 MMHG | WEIGHT: 165 LBS | HEIGHT: 69 IN

## 2023-03-08 DIAGNOSIS — R00.2 PALPITATIONS: ICD-10-CM

## 2023-03-08 PROCEDURE — 93000 ELECTROCARDIOGRAM COMPLETE: CPT

## 2023-03-08 PROCEDURE — 99215 OFFICE O/P EST HI 40 MIN: CPT

## 2023-04-06 NOTE — ASU DISCHARGE PLAN (ADULT/PEDIATRIC) - NPI NUMBER (FOR SYSADMIN USE ONLY) :
[[id number]] - Follow up with your pediatrician within 1-2 days.   - Stay well hydrated (water, gatorade, powerade, chicken broth).   - Return to the ED for new or worsening symptoms.     Headache    A headache is pain or discomfort felt around the head or neck area.    SEEK IMMEDIATE MEDICAL CARE IF YOU HAVE ANY OF THE FOLLOWING SYMPTOMS: fever, vomiting, stiff neck, loss of balance, trouble walking, passing out, or confusion.     - Seguimiento con malik pediatra dentro de 1-2 días.  - Manténgase honey hidratado (agua, gatorade, powerade, caldo de laina).  - Regrese al servicio de urgencias por síntomas nuevos o que empeoran.    Dolor de hortencia    Un dolor de hortencia es un dolor o molestia que se siente alrededor del área de la hortencia o el bentley.    BUSQUE ATENCIÓN MÉDICA INMEDIATA SI TIENE ALGUNO DE LOS SIGUIENTES SÍNTOMAS: fiebre, vómitos, rigidez en el bentley, pérdida del equilibrio, dificultad para caminar, desmayo o confusión.

## 2023-04-18 ENCOUNTER — APPOINTMENT (OUTPATIENT)
Dept: PAIN MANAGEMENT | Facility: CLINIC | Age: 68
End: 2023-04-18
Payer: MEDICARE

## 2023-04-18 VITALS
HEART RATE: 69 BPM | BODY MASS INDEX: 24.44 KG/M2 | SYSTOLIC BLOOD PRESSURE: 87 MMHG | DIASTOLIC BLOOD PRESSURE: 51 MMHG | WEIGHT: 165 LBS | HEIGHT: 69 IN

## 2023-04-18 PROCEDURE — 99214 OFFICE O/P EST MOD 30 MIN: CPT

## 2023-04-18 NOTE — ASSESSMENT
[FreeTextEntry1] : 66 y/o F with Afib s/p ablation on Propranolol, flecainide who is following up for chronic Migraines. \par \par -Sumatriptan 50 mg prn, has discussed with Dr. Reveles. \par - Given P-Afib now on Eliquis, recommend trial of Ubrelvy as abortive , advised of AE \par \par \par \par

## 2023-04-18 NOTE — HISTORY OF PRESENT ILLNESS
[FreeTextEntry1] : 66 y/o F Pmhx Depression, chronic Migraines, glaucoma, Afib  s/p ablation March 2019, chronic Migraines who is here for follow up.  Since her last visit reports Covid infection last year followed by Pafib, and 3 weeks later had GI virus with N/V/D and had P-Afib . ILR explant and reimplanted Jan 13th th 2023 and restarted Eliquis 5 mg BID.  She had Covid infection 3 weeks ago.  She is having headaches once per week described as a pounding pain 7/10, no sens to light or noise, no nausea, vomiting. \par \par  Triggers include red wine, aged cheese, citrus and, yeast extract. She takes sumatriptan 50 mg which helps. \par \par Current meds include: Propranolol, flecainide 50 mg Bid, sumatriptan prn \par \par Social hx: She is a clinical psychologist

## 2023-05-01 ENCOUNTER — APPOINTMENT (OUTPATIENT)
Dept: ELECTROPHYSIOLOGY | Facility: CLINIC | Age: 68
End: 2023-05-01
Payer: MEDICARE

## 2023-05-01 ENCOUNTER — NON-APPOINTMENT (OUTPATIENT)
Age: 68
End: 2023-05-01

## 2023-05-01 VITALS
WEIGHT: 165 LBS | BODY MASS INDEX: 24.44 KG/M2 | DIASTOLIC BLOOD PRESSURE: 71 MMHG | HEART RATE: 70 BPM | SYSTOLIC BLOOD PRESSURE: 107 MMHG | HEIGHT: 69 IN | OXYGEN SATURATION: 98 %

## 2023-05-01 PROCEDURE — 99213 OFFICE O/P EST LOW 20 MIN: CPT

## 2023-05-01 PROCEDURE — 93000 ELECTROCARDIOGRAM COMPLETE: CPT | Mod: 59

## 2023-05-01 PROCEDURE — 93291 INTERROG DEV EVAL SCRMS IP: CPT

## 2023-05-01 RX ORDER — COVID-19 ANTIGEN TEST
KIT MISCELLANEOUS
Qty: 8 | Refills: 0 | Status: DISCONTINUED | COMMUNITY
Start: 2023-04-25

## 2023-05-02 NOTE — DISCUSSION/SUMMARY
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 She had a few self-limiting episodes of afib in the setting of severe dehydration and viral gastroenteritis in early March, which were asymptomatic and caught on ILR. She has otherwise had no recurrent AF and is doing well from a symptoms standpoint. Will continue with current medical management including Eliquis, flecainide and propranolol. Return to clinic in 6 months for follow up. [EKG obtained to assist in diagnosis and management of assessed problem(s)] : EKG obtained to assist in diagnosis and management of assessed problem(s)

## 2023-05-02 NOTE — HISTORY OF PRESENT ILLNESS
[FreeTextEntry1] : I had the pleasure of seeing your patient Allyssa Quezada today in the arrhythmia clinic of [facility]. 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  The patient had presented to the emergency room in 2018 with a few week history of palpitations or racing heart. This was new to her. She reported not eating or drinking the day prior to the episode. She was also recently started on Prozac. She was started on metoprolol XL 25 mg and given aspirin. A workup included a normal echocardiogram and she was discharged home. On followup a Holter monitor demonstrated a 10% atrial fibrillation burden as well as some episodes of atrial tachycardia with rates in the high 100s. Her minimum heart rates were in the 50s. It was recommended that she start on anticoagulation but she chose not to. An echocardiogram revealed Normal left ventricular size and function, mitral valve prolapse. \par \par When I saw her in clinic she was amenable to starting anticoagulation and started to Xarelto. However she did not tolerate this due to gastritis and was switched to Eliquis. She opted for an ablative approach and underwent an ablation procedure on January 3, 2019. This included a pulmonary vein isolation and cava tricuspid isthmus line. She made a good recovery however early on she did have recurrent atrial fibrillation which  out over the first few weeks. In addition she was complaining of flatulence on the Eliquis but reported she was continuing to take it. On follow-up she had no further Afib and given her low risk score AC was discontinued. She has since been restarted on AC with xarelto given age >65 now and short episodes of AF. She is maintained on flecanide and propranolol.\par \par Today the patient reports doing well. She had some episodes of afib for a day in early March which coincides with an episode of viral gastroenteritis. She reports being very dehydrated from vomiting and diarrhea at that time. She denies feeling palpitations or any symptoms during that time. She otherwise feels well and is tolerating the medications including xarelto. Her ECG today shows sinus rhythm at 71bpm (QRS 112ms). Her BP is 107/71 with HR 70bpm.

## 2023-06-01 ENCOUNTER — NON-APPOINTMENT (OUTPATIENT)
Age: 68
End: 2023-06-01

## 2023-06-01 ENCOUNTER — APPOINTMENT (OUTPATIENT)
Dept: ELECTROPHYSIOLOGY | Facility: CLINIC | Age: 68
End: 2023-06-01
Payer: MEDICARE

## 2023-06-01 PROCEDURE — 93298 REM INTERROG DEV EVAL SCRMS: CPT

## 2023-06-01 PROCEDURE — G2066: CPT

## 2023-06-02 ENCOUNTER — RX RENEWAL (OUTPATIENT)
Age: 68
End: 2023-06-02

## 2023-06-09 ENCOUNTER — OUTPATIENT (OUTPATIENT)
Dept: OUTPATIENT SERVICES | Facility: HOSPITAL | Age: 68
LOS: 1 days | End: 2023-06-09
Payer: MEDICARE

## 2023-06-09 ENCOUNTER — APPOINTMENT (OUTPATIENT)
Dept: CV DIAGNOSITCS | Facility: HOSPITAL | Age: 68
End: 2023-06-09

## 2023-06-09 DIAGNOSIS — I48.91 UNSPECIFIED ATRIAL FIBRILLATION: ICD-10-CM

## 2023-06-09 DIAGNOSIS — Z98.890 OTHER SPECIFIED POSTPROCEDURAL STATES: Chronic | ICD-10-CM

## 2023-06-09 PROCEDURE — 76376 3D RENDER W/INTRP POSTPROCES: CPT | Mod: 26

## 2023-06-09 PROCEDURE — 93306 TTE W/DOPPLER COMPLETE: CPT

## 2023-06-09 PROCEDURE — 93356 MYOCRD STRAIN IMG SPCKL TRCK: CPT

## 2023-06-09 PROCEDURE — 93306 TTE W/DOPPLER COMPLETE: CPT | Mod: 26

## 2023-06-09 PROCEDURE — 76376 3D RENDER W/INTRP POSTPROCES: CPT

## 2023-07-03 ENCOUNTER — NON-APPOINTMENT (OUTPATIENT)
Age: 68
End: 2023-07-03

## 2023-07-03 ENCOUNTER — APPOINTMENT (OUTPATIENT)
Dept: ELECTROPHYSIOLOGY | Facility: CLINIC | Age: 68
End: 2023-07-03
Payer: MEDICARE

## 2023-07-03 PROCEDURE — G2066: CPT

## 2023-07-03 PROCEDURE — 93298 REM INTERROG DEV EVAL SCRMS: CPT

## 2023-08-07 ENCOUNTER — APPOINTMENT (OUTPATIENT)
Dept: ELECTROPHYSIOLOGY | Facility: CLINIC | Age: 68
End: 2023-08-07
Payer: MEDICARE

## 2023-08-07 ENCOUNTER — NON-APPOINTMENT (OUTPATIENT)
Age: 68
End: 2023-08-07

## 2023-08-07 ENCOUNTER — RX RENEWAL (OUTPATIENT)
Age: 68
End: 2023-08-07

## 2023-08-07 PROCEDURE — 93298 REM INTERROG DEV EVAL SCRMS: CPT

## 2023-08-07 PROCEDURE — G2066: CPT

## 2023-09-11 ENCOUNTER — NON-APPOINTMENT (OUTPATIENT)
Age: 68
End: 2023-09-11

## 2023-09-11 ENCOUNTER — APPOINTMENT (OUTPATIENT)
Dept: ELECTROPHYSIOLOGY | Facility: CLINIC | Age: 68
End: 2023-09-11
Payer: MEDICARE

## 2023-09-11 NOTE — PRE-OP CHECKLIST - HOW ADMINISTERED
Medication: FLUoxetine (PROzac) 10 MG capsule  Last office visit date: 11-9-2022  Next appointment scheduled?: Yes   Number of refills given: 0   Self Administrated

## 2023-09-12 PROCEDURE — 93298 REM INTERROG DEV EVAL SCRMS: CPT

## 2023-09-12 PROCEDURE — G2066: CPT

## 2023-10-04 ENCOUNTER — APPOINTMENT (OUTPATIENT)
Dept: CARDIOLOGY | Facility: CLINIC | Age: 68
End: 2023-10-04
Payer: MEDICARE

## 2023-10-04 ENCOUNTER — NON-APPOINTMENT (OUTPATIENT)
Age: 68
End: 2023-10-04

## 2023-10-04 VITALS
HEART RATE: 63 BPM | DIASTOLIC BLOOD PRESSURE: 66 MMHG | SYSTOLIC BLOOD PRESSURE: 100 MMHG | WEIGHT: 165 LBS | HEIGHT: 69 IN | BODY MASS INDEX: 24.44 KG/M2 | OXYGEN SATURATION: 98 %

## 2023-10-04 PROCEDURE — 93000 ELECTROCARDIOGRAM COMPLETE: CPT

## 2023-10-04 PROCEDURE — 99214 OFFICE O/P EST MOD 30 MIN: CPT

## 2023-10-16 ENCOUNTER — NON-APPOINTMENT (OUTPATIENT)
Age: 68
End: 2023-10-16

## 2023-10-16 ENCOUNTER — APPOINTMENT (OUTPATIENT)
Dept: ELECTROPHYSIOLOGY | Facility: CLINIC | Age: 68
End: 2023-10-16
Payer: MEDICARE

## 2023-10-17 PROCEDURE — G2066: CPT

## 2023-10-17 PROCEDURE — 93298 REM INTERROG DEV EVAL SCRMS: CPT

## 2023-10-18 ENCOUNTER — APPOINTMENT (OUTPATIENT)
Dept: PAIN MANAGEMENT | Facility: CLINIC | Age: 68
End: 2023-10-18
Payer: MEDICARE

## 2023-10-18 ENCOUNTER — NON-APPOINTMENT (OUTPATIENT)
Age: 68
End: 2023-10-18

## 2023-10-18 VITALS
HEIGHT: 69 IN | HEART RATE: 59 BPM | SYSTOLIC BLOOD PRESSURE: 113 MMHG | DIASTOLIC BLOOD PRESSURE: 74 MMHG | WEIGHT: 165 LBS | BODY MASS INDEX: 24.44 KG/M2

## 2023-10-18 DIAGNOSIS — G43.709 CHRONIC MIGRAINE W/OUT AURA, NOT INTRACTABLE, W/OUT STATUS MIGRAINOSUS: ICD-10-CM

## 2023-10-18 PROCEDURE — 99213 OFFICE O/P EST LOW 20 MIN: CPT

## 2023-10-18 RX ORDER — UBROGEPANT 100 MG/1
100 TABLET ORAL
Qty: 3 | Refills: 3 | Status: ACTIVE | COMMUNITY
Start: 2023-04-18 | End: 1900-01-01

## 2023-11-06 RX ORDER — PROPRANOLOL HYDROCHLORIDE 60 MG/1
60 CAPSULE, EXTENDED RELEASE ORAL
Qty: 90 | Refills: 3 | Status: ACTIVE | COMMUNITY
Start: 2018-12-03 | End: 1900-01-01

## 2023-11-08 ENCOUNTER — RX RENEWAL (OUTPATIENT)
Age: 68
End: 2023-11-08

## 2023-11-17 ENCOUNTER — APPOINTMENT (OUTPATIENT)
Dept: ELECTROPHYSIOLOGY | Facility: CLINIC | Age: 68
End: 2023-11-17
Payer: MEDICARE

## 2023-11-17 ENCOUNTER — NON-APPOINTMENT (OUTPATIENT)
Age: 68
End: 2023-11-17

## 2023-11-18 PROCEDURE — G2066: CPT

## 2023-11-18 PROCEDURE — 93298 REM INTERROG DEV EVAL SCRMS: CPT

## 2023-12-19 NOTE — ED ADULT NURSE NOTE - NSSISCREENINGQ4_ED_A_ED
All adult screening ordered and done appropriate for patient's age and gender and risk factors and complaints. Recommend weight loss via daily exercising and consistent healthy dietary changes. Encouraged physical fitness and daily physical activity daily. Referred to Bariatrics. Sleep study recommended.
No

## 2023-12-22 ENCOUNTER — NON-APPOINTMENT (OUTPATIENT)
Age: 68
End: 2023-12-22

## 2023-12-22 ENCOUNTER — APPOINTMENT (OUTPATIENT)
Dept: ELECTROPHYSIOLOGY | Facility: CLINIC | Age: 68
End: 2023-12-22
Payer: MEDICARE

## 2023-12-23 PROCEDURE — G2066: CPT

## 2023-12-23 PROCEDURE — 93298 REM INTERROG DEV EVAL SCRMS: CPT

## 2024-01-25 ENCOUNTER — NON-APPOINTMENT (OUTPATIENT)
Age: 69
End: 2024-01-25

## 2024-01-25 ENCOUNTER — APPOINTMENT (OUTPATIENT)
Dept: ELECTROPHYSIOLOGY | Facility: CLINIC | Age: 69
End: 2024-01-25
Payer: MEDICARE

## 2024-01-26 PROCEDURE — 93298 REM INTERROG DEV EVAL SCRMS: CPT

## 2024-02-27 ENCOUNTER — NON-APPOINTMENT (OUTPATIENT)
Age: 69
End: 2024-02-27

## 2024-02-27 ENCOUNTER — APPOINTMENT (OUTPATIENT)
Dept: ELECTROPHYSIOLOGY | Facility: CLINIC | Age: 69
End: 2024-02-27
Payer: MEDICARE

## 2024-02-27 PROCEDURE — 93298 REM INTERROG DEV EVAL SCRMS: CPT

## 2024-03-05 RX ORDER — APIXABAN 5 MG/1
5 TABLET, FILM COATED ORAL
Qty: 180 | Refills: 3 | Status: ACTIVE | COMMUNITY
Start: 2023-03-06 | End: 1900-01-01

## 2024-04-02 ENCOUNTER — APPOINTMENT (OUTPATIENT)
Dept: ELECTROPHYSIOLOGY | Facility: CLINIC | Age: 69
End: 2024-04-02
Payer: MEDICARE

## 2024-04-02 ENCOUNTER — NON-APPOINTMENT (OUTPATIENT)
Age: 69
End: 2024-04-02

## 2024-04-02 PROCEDURE — 93298 REM INTERROG DEV EVAL SCRMS: CPT

## 2024-04-03 PROBLEM — I48.0 PAROXYSMAL ATRIAL FIBRILLATION: Status: ACTIVE | Noted: 2018-11-02

## 2024-04-03 PROBLEM — I48.91 AFIB: Status: ACTIVE | Noted: 2019-01-11

## 2024-04-08 ENCOUNTER — APPOINTMENT (OUTPATIENT)
Dept: CARDIOLOGY | Facility: CLINIC | Age: 69
End: 2024-04-08
Payer: MEDICARE

## 2024-04-08 ENCOUNTER — NON-APPOINTMENT (OUTPATIENT)
Age: 69
End: 2024-04-08

## 2024-04-08 VITALS
BODY MASS INDEX: 24.81 KG/M2 | HEART RATE: 62 BPM | WEIGHT: 168 LBS | OXYGEN SATURATION: 97 % | SYSTOLIC BLOOD PRESSURE: 110 MMHG | DIASTOLIC BLOOD PRESSURE: 70 MMHG

## 2024-04-08 DIAGNOSIS — K21.9 GASTRO-ESOPHAGEAL REFLUX DISEASE W/OUT ESOPHAGITIS: ICD-10-CM

## 2024-04-08 DIAGNOSIS — I48.0 PAROXYSMAL ATRIAL FIBRILLATION: ICD-10-CM

## 2024-04-08 DIAGNOSIS — I48.91 UNSPECIFIED ATRIAL FIBRILLATION: ICD-10-CM

## 2024-04-08 DIAGNOSIS — I34.1 NONRHEUMATIC MITRAL (VALVE) PROLAPSE: ICD-10-CM

## 2024-04-08 PROCEDURE — G2211 COMPLEX E/M VISIT ADD ON: CPT

## 2024-04-08 PROCEDURE — 99214 OFFICE O/P EST MOD 30 MIN: CPT

## 2024-04-08 PROCEDURE — 93000 ELECTROCARDIOGRAM COMPLETE: CPT

## 2024-04-08 RX ORDER — PANTOPRAZOLE 40 MG/1
40 TABLET, DELAYED RELEASE ORAL DAILY
Qty: 90 | Refills: 1 | Status: ACTIVE | COMMUNITY
Start: 2024-04-08 | End: 1900-01-01

## 2024-05-06 ENCOUNTER — RX RENEWAL (OUTPATIENT)
Age: 69
End: 2024-05-06

## 2024-05-06 RX ORDER — FLECAINIDE ACETATE 50 MG/1
50 TABLET ORAL
Qty: 180 | Refills: 0 | Status: ACTIVE | COMMUNITY
Start: 2019-02-06 | End: 1900-01-01

## 2024-05-07 ENCOUNTER — NON-APPOINTMENT (OUTPATIENT)
Age: 69
End: 2024-05-07

## 2024-05-07 ENCOUNTER — APPOINTMENT (OUTPATIENT)
Dept: ELECTROPHYSIOLOGY | Facility: CLINIC | Age: 69
End: 2024-05-07
Payer: MEDICARE

## 2024-05-07 PROCEDURE — 93298 REM INTERROG DEV EVAL SCRMS: CPT

## 2024-05-31 ENCOUNTER — APPOINTMENT (OUTPATIENT)
Dept: CARDIOLOGY | Facility: CLINIC | Age: 69
End: 2024-05-31
Payer: MEDICARE

## 2024-05-31 PROCEDURE — 93306 TTE W/DOPPLER COMPLETE: CPT

## 2024-06-10 NOTE — DISCHARGE NOTE ADULT - NSFUCAREDSC_ALL_CORE_SIUH
"H & P- Obstetrics   Angelia Anderson 32 y.o. female MRN: [id number]  Unit/Bed#:  206-01 Encounter: [id number]    Assessment: 32 y.o.  at 39w1d admitted for elective IOL.    Plan:   * Encounter for induction of labor  Assessment & Plan  Admit to OBGYN   Clear liquid diet, IVF LR 125cc/hr   F/u T&S, CBC, RPR   GBS negative; EFW: 20%   SVE: 4.5/50/-3  Continuous fetal monitoring and tocometry   Analgesia at maternal request   Vertex by TAUS  Plan: pitocin    39 weeks gestation of pregnancy  Assessment & Plan  PNL wnl   A+  GBS neg  EFW Hadlock 4 2563 grams - 5 lbs 10 oz (20%) at 36w2d    History of pulmonary embolism  Assessment & Plan  Hx of PE at 21wks in fourth pregnancy  Work-up at that time with weakly positive anticardiolipin IgM  Patient stopped taking Lovenox this pregnancy at 30 weeks when her CT scan was negative for PE  Counseled patient on importance of Lovenox for 6 weeks postpartum      History of postpartum depression  Assessment & Plan  Consider early EPDS    Iron deficiency anemia secondary to inadequate dietary iron intake  Assessment & Plan  Malabsorption of iron and vit b12 due to gastric sleeve  Venofer infusions during pregnancy  F/u admission CBC    Bariatric surgery status complicating pregnancy, third trimester  Assessment & Plan  Gastric sleeve  Avoid NSAIDs      Discussed case and plan w/ Dr. Gonzalo Douglas.    Chief Complaint: \"I'm here for my induction.\"    HPI: Angelia Anderson is a 32 y.o.  with an OLIVER of 2024, by Ultrasound at 39w1d who is being admitted for elective IOL. She complains of irregular uterine contractions, has no LOF, and reports no VB. She states she has felt good FM.    Patient Active Problem List   Diagnosis    Bariatric surgery status complicating pregnancy, third trimester    Iron deficiency anemia secondary to inadequate dietary iron intake    OAB (overactive bladder)    Prior  loss in second trimester, antepartum    History of postpartum " depression    History of gestational diabetes    Maternal obesity, antepartum    Vitamin D deficiency    Vitamin A deficiency    B12 deficiency    History of pulmonary embolism    39 weeks gestation of pregnancy    Short interval between pregnancies affecting pregnancy in third trimester, antepartum    Skin lesion    Anemia affecting pregnancy in third trimester    Encounter for induction of labor       Baby complications/comments: none    Review of Systems   Constitutional:  Negative for chills and fever.   Respiratory:  Negative for cough and shortness of breath.    Gastrointestinal:  Negative for diarrhea, nausea and vomiting.   Genitourinary:  Negative for dysuria and hematuria.   Skin:  Negative for color change and rash.   Neurological:  Negative for dizziness, syncope and headaches.       OB Hx:  OB History    Para Term  AB Living   6 2 2 0 3 2   SAB IAB Ectopic Multiple Live Births   2 1 0 0 2      # Outcome Date GA Lbr Jun/2nd Weight Sex Type Anes PTL Lv   6 Current            5 SAB 23     SAB      4 Term 03/15/23 39w1d / 00:12 2975 g (6 lb 8.9 oz) F Vag-Spont EPI N SONAM   3 SAB  16w0d          2 IAB 2019 10w0d          1 Term 14 37w0d  2948 g (6 lb 8 oz) M Vag-Spont   SONAM      Complications: Gestational diabetes       Past Medical Hx:  Past Medical History:   Diagnosis Date    Anemia     H/O gastric sleeve 2016    History of transfusion     states no transfusion rxn    Miscarriage     Personal history of COVID-2021    recovered at home    Post partum depression 2014    Pulmonary embolus (HCC) 2022    approx, during pregnancy    Sleep apnea, obstructive     prior to weight loss surg       Past Surgical hx:  Past Surgical History:   Procedure Laterality Date    BARIATRIC SURGERY      DILATION AND EVACUATION  2019        GASTRIC RESTRICTION SURGERY  2015    gastric sleeve    INCISION AND DRAINAGE OF WOUND Right 2022    Procedure: INCISION AND DRAINAGE  (I&D) HEAD/FACE;  Surgeon: Sumit Matos DMD;  Location: [facility];  Service: Maxillofacial    MULTIPLE TOOTH EXTRACTIONS Right 2022    Procedure: EXTRACTION TEETH MULTIPLE;  Surgeon: Sumit Matos DMD;  Location: [facility];  Service: Maxillofacial    OPEN ANTERIOR SHOULDER RECONSTRUCTION Left     OTHER SURGICAL HISTORY      sweat gland removal    ME TX MISSED  FIRST TRIMESTER SURGICAL N/A 2023    Procedure: DILATATION AND EVACUATION (D&E);  Surgeon: Radha Knowles MD;  Location: [facility];  Service: Gynecology    RETAINED PLACENTA REMOVAL N/A 2020    Procedure: EXTRACTION OF PLACENTA,MANUAL;  Surgeon: Gray Campa MD;  Location: [facility];  Service: Obstetrics       Social Hx:  Alcohol use: denies  Tobacco use: denies  Other substance use: denies      No Known Allergies    No medications prior to admission.    Objective:  Temp:  [98.5 °F (36.9 °C)] 98.5 °F (36.9 °C)  HR:  [] 100  Resp:  [18] 18  BP: (117)/(71) 117/71  Body mass index is 39.99 kg/m².     Physical Exam:  Physical Exam  HENT:      Head: Normocephalic and atraumatic.      Mouth/Throat:      Mouth: Mucous membranes are moist.   Cardiovascular:      Rate and Rhythm: Normal rate.      Pulses: Normal pulses.   Pulmonary:      Effort: Pulmonary effort is normal. No respiratory distress.      Breath sounds: Normal breath sounds.   Abdominal:      General: There is no distension.   Neurological:      General: No focal deficit present.      Mental Status: She is alert.   Skin:     General: Skin is warm and dry.   Psychiatric:         Mood and Affect: Mood normal.         Behavior: Behavior normal.   Vitals reviewed.          FHT:  Baseline Rate (FHR): 140 bpm  FHR Category: Category I  Moderate variability  15 x 15 accelerations present  No decelerations    TOCO:   Contraction Frequency (minutes): 11  Contraction Duration (seconds): 110  Contraction Intensity: Mild    Lab Results   Component Value Date    WBC  6.01 06/10/2024    HGB 10.5 (L) 06/10/2024    HCT 34.7 (L) 06/10/2024     06/10/2024     Lab Results   Component Value Date    K 3.3 (L) 04/07/2024     04/07/2024    CO2 23 04/07/2024    BUN 7 04/07/2024    CREATININE 0.62 04/07/2024    AST 11 (L) 12/14/2023    ALT 6 (L) 12/14/2023     Prenatal Labs: Reviewed     Blood type: A+  Antibody: negative  GBS: negative  HIV: non-reactive  Rubella: immune  Syphilis IgM/IgG: non-reactive  HBsAg: non-reactive  HCAb: non-reactive  Chlamydia: negative  Gonorrhea: negative  Diabetes 1 hour screen: passed  3 hour glucose: n/a  Platelets: 240, pending admission CBC  Hgb: 9.7, pending admission CBC  >2 Midnights  INPATIENT     Signature/Title: Arlene Boyle MD  Date: 6/10/2024  Time: 8:53 AM     No, the patient is not being discharged from [facility]

## 2024-06-11 ENCOUNTER — APPOINTMENT (OUTPATIENT)
Dept: ELECTROPHYSIOLOGY | Facility: CLINIC | Age: 69
End: 2024-06-11
Payer: MEDICARE

## 2024-06-11 ENCOUNTER — NON-APPOINTMENT (OUTPATIENT)
Age: 69
End: 2024-06-11

## 2024-06-11 PROCEDURE — 93298 REM INTERROG DEV EVAL SCRMS: CPT

## 2024-06-21 ENCOUNTER — APPOINTMENT (OUTPATIENT)
Dept: GASTROENTEROLOGY | Facility: CLINIC | Age: 69
End: 2024-06-21
Payer: MEDICARE

## 2024-06-21 VITALS
OXYGEN SATURATION: 97 % | SYSTOLIC BLOOD PRESSURE: 121 MMHG | DIASTOLIC BLOOD PRESSURE: 74 MMHG | RESPIRATION RATE: 14 BRPM | WEIGHT: 170 LBS | BODY MASS INDEX: 25.18 KG/M2 | HEART RATE: 66 BPM | HEIGHT: 69 IN

## 2024-06-21 DIAGNOSIS — R10.13 EPIGASTRIC PAIN: ICD-10-CM

## 2024-06-21 DIAGNOSIS — K29.70 GASTRITIS, UNSPECIFIED, W/OUT BLEEDING: ICD-10-CM

## 2024-06-21 PROCEDURE — 99203 OFFICE O/P NEW LOW 30 MIN: CPT

## 2024-06-21 NOTE — HISTORY OF PRESENT ILLNESS
[FreeTextEntry1] : 68-year-old female recent attack of " gastritis" Upper abdominal pain Denied NSAID use May be some dietary indiscretion, "dark chocolate" Seen by another gastroenterologist, started on Carafate, also prescribed pantoprazole, both of which she has used in the past Resolution of complaints over the past 2 weeks, decreasing Carafate dose still using pantoprazole 40 mg daily Denies black or bloody bowel movements.  Denies any history of peptic ulcer  Previously seen by Dr. Garcia At different times diagnosed with pancreas disease him, subsequently gastritis after procedure by Dr. Kj Mandujano in [place]  Last colonoscopy 8 years ago, reportedly unremarkable performed by Dr. 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

## 2024-06-21 NOTE — ASSESSMENT
[FreeTextEntry1] : Gastritis, dyspepsia Complaints largely resolved for the past 2 weeks following reinitiation of pantoprazole and Carafate Recommend continuing for further 2 weeks at current dose, then tapering off No indication for any upper endoscopy or diagnostic testing at this time Office visit again in 1 year or sooner as needed

## 2024-07-16 ENCOUNTER — APPOINTMENT (OUTPATIENT)
Dept: ELECTROPHYSIOLOGY | Facility: CLINIC | Age: 69
End: 2024-07-16
Payer: MEDICARE

## 2024-07-16 ENCOUNTER — NON-APPOINTMENT (OUTPATIENT)
Age: 69
End: 2024-07-16

## 2024-07-16 PROCEDURE — 93298 REM INTERROG DEV EVAL SCRMS: CPT

## 2024-07-31 NOTE — ED ADULT NURSE NOTE - BREATHING, MLM
Spontaneous, unlabored and symmetrical

## 2024-08-05 ENCOUNTER — RX RENEWAL (OUTPATIENT)
Age: 69
End: 2024-08-05

## 2024-08-19 ENCOUNTER — NON-APPOINTMENT (OUTPATIENT)
Age: 69
End: 2024-08-19

## 2024-08-19 ENCOUNTER — APPOINTMENT (OUTPATIENT)
Dept: ELECTROPHYSIOLOGY | Facility: CLINIC | Age: 69
End: 2024-08-19
Payer: MEDICARE

## 2024-08-19 PROCEDURE — 93298 REM INTERROG DEV EVAL SCRMS: CPT

## 2024-09-14 ENCOUNTER — NON-APPOINTMENT (OUTPATIENT)
Age: 69
End: 2024-09-14

## 2024-09-17 ENCOUNTER — NON-APPOINTMENT (OUTPATIENT)
Age: 69
End: 2024-09-17

## 2024-09-18 ENCOUNTER — APPOINTMENT (OUTPATIENT)
Dept: PAIN MANAGEMENT | Facility: CLINIC | Age: 69
End: 2024-09-18
Payer: MEDICARE

## 2024-09-18 VITALS
DIASTOLIC BLOOD PRESSURE: 84 MMHG | BODY MASS INDEX: 25.18 KG/M2 | HEIGHT: 69 IN | WEIGHT: 170 LBS | SYSTOLIC BLOOD PRESSURE: 123 MMHG | HEART RATE: 66 BPM

## 2024-09-18 PROCEDURE — 99214 OFFICE O/P EST MOD 30 MIN: CPT

## 2024-09-18 NOTE — PHYSICAL EXAM
[General Appearance - Alert] : alert [Oriented To Time, Place, And Person] : oriented to person, place, and time [Affect] : the affect was normal [Mood] : the mood was normal [Cranial Nerves Optic (II)] : visual acuity intact bilaterally,  visual fields full to confrontation, pupils equal round and reactive to light [Cranial Nerves Oculomotor (III)] : extraocular motion intact [Cranial Nerves Facial (VII)] : face symmetrical [Cranial Nerves Trigeminal (V)] : facial sensation intact symmetrically [Cranial Nerves Vestibulocochlear (VIII)] : hearing was intact bilaterally [Cranial Nerves Glossopharyngeal (IX)] : tongue and palate midline [Cranial Nerves Accessory (XI - Cranial And Spinal)] : head turning and shoulder shrug symmetric [Cranial Nerves Hypoglossal (XII)] : there was no tongue deviation with protrusion [Sensation Tactile Decrease] : light touch was intact [Sclera] : the sclera and conjunctiva were normal [Neck Appearance] : the appearance of the neck was normal [No CVA Tenderness] : no ~M costovertebral angle tenderness [Abnormal Walk] : normal gait [Motor Strength Upper Extremities Bilaterally] : strength was normal in both upper extremities [Motor Strength Lower Extremities Bilaterally] : strength was normal in both lower extremities

## 2024-09-18 NOTE — ASSESSMENT
[FreeTextEntry1] : 69 y/o F with Afib s/p ablation on Propranolol, flecainide who is following up for chronic Migraines.  continue Ubrelvy prn as abortive.  - Given P-Afib now on Eliquis, recommend trial of Ubrelvy as abortive , advised of AE fu in 6-12 months

## 2024-09-18 NOTE — HISTORY OF PRESENT ILLNESS
[FreeTextEntry1] : 67 y/o F Pmhx Depression, chronic Migraines, glaucoma, Afib  s/p ablation March 2019, chronic Migraines who is here for follow up.  Overall feeling well and without new significant medical issues.  She is having headaches once per week on average with decreased intensity now 3-4/10 (prior 7/10), caffeine in AM helps when she wakes up with migraine. She has been taking Ubrelvy which she feels has been helpful but takes 45 min to 1 hour to start working.     Triggers include red wine, aged cheese, citrus and, yeast extract. She takes sumatriptan 50 mg which helps.   Prior meds: sumatriptan prn, Current meds include: Ubrelvy, Propranolol 60mg, flecainide 50 mg Bid, Eliquis 5 mg BID  Social hx: She is a clinical psychologist

## 2024-09-23 ENCOUNTER — NON-APPOINTMENT (OUTPATIENT)
Age: 69
End: 2024-09-23

## 2024-09-23 ENCOUNTER — APPOINTMENT (OUTPATIENT)
Dept: ELECTROPHYSIOLOGY | Facility: CLINIC | Age: 69
End: 2024-09-23
Payer: MEDICARE

## 2024-09-23 PROCEDURE — 93298 REM INTERROG DEV EVAL SCRMS: CPT

## 2024-10-03 ENCOUNTER — RX RENEWAL (OUTPATIENT)
Age: 69
End: 2024-10-03

## 2024-10-28 ENCOUNTER — NON-APPOINTMENT (OUTPATIENT)
Age: 69
End: 2024-10-28

## 2024-10-28 ENCOUNTER — APPOINTMENT (OUTPATIENT)
Dept: ELECTROPHYSIOLOGY | Facility: CLINIC | Age: 69
End: 2024-10-28
Payer: MEDICARE

## 2024-10-28 PROCEDURE — 93298 REM INTERROG DEV EVAL SCRMS: CPT

## 2024-11-05 ENCOUNTER — RX RENEWAL (OUTPATIENT)
Age: 69
End: 2024-11-05

## 2024-11-06 ENCOUNTER — RX RENEWAL (OUTPATIENT)
Age: 69
End: 2024-11-06

## 2024-12-02 ENCOUNTER — NON-APPOINTMENT (OUTPATIENT)
Age: 69
End: 2024-12-02

## 2024-12-02 ENCOUNTER — APPOINTMENT (OUTPATIENT)
Dept: ELECTROPHYSIOLOGY | Facility: CLINIC | Age: 69
End: 2024-12-02
Payer: MEDICARE

## 2024-12-02 PROCEDURE — 93298 REM INTERROG DEV EVAL SCRMS: CPT

## 2025-01-06 ENCOUNTER — APPOINTMENT (OUTPATIENT)
Dept: ELECTROPHYSIOLOGY | Facility: CLINIC | Age: 70
End: 2025-01-06
Payer: MEDICARE

## 2025-01-06 ENCOUNTER — NON-APPOINTMENT (OUTPATIENT)
Age: 70
End: 2025-01-06

## 2025-01-06 PROCEDURE — 93298 REM INTERROG DEV EVAL SCRMS: CPT

## 2025-02-06 ENCOUNTER — RX RENEWAL (OUTPATIENT)
Age: 70
End: 2025-02-06

## 2025-02-10 ENCOUNTER — NON-APPOINTMENT (OUTPATIENT)
Age: 70
End: 2025-02-10

## 2025-02-10 ENCOUNTER — APPOINTMENT (OUTPATIENT)
Dept: ELECTROPHYSIOLOGY | Facility: CLINIC | Age: 70
End: 2025-02-10
Payer: MEDICARE

## 2025-02-10 PROCEDURE — 93298 REM INTERROG DEV EVAL SCRMS: CPT

## 2025-03-13 ENCOUNTER — NON-APPOINTMENT (OUTPATIENT)
Age: 70
End: 2025-03-13

## 2025-03-13 ENCOUNTER — APPOINTMENT (OUTPATIENT)
Dept: ELECTROPHYSIOLOGY | Facility: CLINIC | Age: 70
End: 2025-03-13
Payer: MEDICARE

## 2025-03-13 PROCEDURE — 93298 REM INTERROG DEV EVAL SCRMS: CPT

## 2025-04-17 ENCOUNTER — APPOINTMENT (OUTPATIENT)
Dept: ELECTROPHYSIOLOGY | Facility: CLINIC | Age: 70
End: 2025-04-17
Payer: MEDICARE

## 2025-04-17 ENCOUNTER — NON-APPOINTMENT (OUTPATIENT)
Age: 70
End: 2025-04-17

## 2025-04-17 PROCEDURE — 93298 REM INTERROG DEV EVAL SCRMS: CPT

## 2025-05-08 ENCOUNTER — APPOINTMENT (OUTPATIENT)
Dept: MRI IMAGING | Facility: CLINIC | Age: 70
End: 2025-05-08
Payer: MEDICARE

## 2025-05-08 PROCEDURE — 70551 MRI BRAIN STEM W/O DYE: CPT

## 2025-05-09 ENCOUNTER — NON-APPOINTMENT (OUTPATIENT)
Age: 70
End: 2025-05-09

## 2025-05-19 ENCOUNTER — NON-APPOINTMENT (OUTPATIENT)
Age: 70
End: 2025-05-19

## 2025-05-19 ENCOUNTER — APPOINTMENT (OUTPATIENT)
Dept: ELECTROPHYSIOLOGY | Facility: CLINIC | Age: 70
End: 2025-05-19
Payer: MEDICARE

## 2025-05-19 PROCEDURE — 93298 REM INTERROG DEV EVAL SCRMS: CPT

## 2025-06-24 ENCOUNTER — NON-APPOINTMENT (OUTPATIENT)
Age: 70
End: 2025-06-24

## 2025-06-24 ENCOUNTER — APPOINTMENT (OUTPATIENT)
Dept: ELECTROPHYSIOLOGY | Facility: CLINIC | Age: 70
End: 2025-06-24
Payer: MEDICARE

## 2025-06-24 PROCEDURE — 93298 REM INTERROG DEV EVAL SCRMS: CPT

## 2025-07-29 ENCOUNTER — APPOINTMENT (OUTPATIENT)
Dept: ELECTROPHYSIOLOGY | Facility: CLINIC | Age: 70
End: 2025-07-29
Payer: MEDICARE

## 2025-07-29 ENCOUNTER — NON-APPOINTMENT (OUTPATIENT)
Age: 70
End: 2025-07-29

## 2025-07-29 PROCEDURE — 93298 REM INTERROG DEV EVAL SCRMS: CPT

## 2025-08-11 ENCOUNTER — NON-APPOINTMENT (OUTPATIENT)
Age: 70
End: 2025-08-11

## 2025-08-12 ENCOUNTER — APPOINTMENT (OUTPATIENT)
Dept: CARDIOLOGY | Facility: CLINIC | Age: 70
End: 2025-08-12
Payer: MEDICARE

## 2025-08-12 VITALS
WEIGHT: 170 LBS | SYSTOLIC BLOOD PRESSURE: 120 MMHG | HEART RATE: 79 BPM | DIASTOLIC BLOOD PRESSURE: 86 MMHG | BODY MASS INDEX: 25.1 KG/M2 | OXYGEN SATURATION: 97 %

## 2025-08-12 DIAGNOSIS — R00.2 PALPITATIONS: ICD-10-CM

## 2025-08-12 DIAGNOSIS — I48.0 PAROXYSMAL ATRIAL FIBRILLATION: ICD-10-CM

## 2025-08-12 DIAGNOSIS — I48.91 UNSPECIFIED ATRIAL FIBRILLATION: ICD-10-CM

## 2025-08-12 PROCEDURE — G2211 COMPLEX E/M VISIT ADD ON: CPT

## 2025-08-12 PROCEDURE — 99214 OFFICE O/P EST MOD 30 MIN: CPT

## 2025-09-02 ENCOUNTER — APPOINTMENT (OUTPATIENT)
Dept: ELECTROPHYSIOLOGY | Facility: CLINIC | Age: 70
End: 2025-09-02
Payer: MEDICARE

## 2025-09-02 ENCOUNTER — NON-APPOINTMENT (OUTPATIENT)
Age: 70
End: 2025-09-02

## 2025-09-02 PROCEDURE — 93298 REM INTERROG DEV EVAL SCRMS: CPT

## 2025-09-15 ENCOUNTER — APPOINTMENT (OUTPATIENT)
Dept: PAIN MANAGEMENT | Facility: CLINIC | Age: 70
End: 2025-09-15
Payer: MEDICARE

## 2025-09-15 VITALS
SYSTOLIC BLOOD PRESSURE: 122 MMHG | DIASTOLIC BLOOD PRESSURE: 71 MMHG | HEART RATE: 63 BPM | WEIGHT: 170 LBS | HEIGHT: 69 IN | BODY MASS INDEX: 25.18 KG/M2

## 2025-09-15 PROCEDURE — 99214 OFFICE O/P EST MOD 30 MIN: CPT

## 2025-09-18 ENCOUNTER — RX RENEWAL (OUTPATIENT)
Age: 70
End: 2025-09-18